# Patient Record
Sex: FEMALE | Race: WHITE | NOT HISPANIC OR LATINO | Employment: UNEMPLOYED | URBAN - NONMETROPOLITAN AREA
[De-identification: names, ages, dates, MRNs, and addresses within clinical notes are randomized per-mention and may not be internally consistent; named-entity substitution may affect disease eponyms.]

---

## 2018-03-27 ENCOUNTER — TRANSCRIBE ORDERS (OUTPATIENT)
Dept: ADMINISTRATIVE | Facility: HOSPITAL | Age: 58
End: 2018-03-27

## 2018-03-27 DIAGNOSIS — R94.5 ABNORMAL RESULTS OF LIVER FUNCTION STUDIES: Primary | ICD-10-CM

## 2018-04-05 ENCOUNTER — HOSPITAL ENCOUNTER (OUTPATIENT)
Dept: ULTRASOUND IMAGING | Facility: HOSPITAL | Age: 58
Discharge: HOME | End: 2018-04-05
Admitting: NURSE PRACTITIONER

## 2018-04-05 DIAGNOSIS — R94.5 ABNORMAL RESULTS OF LIVER FUNCTION STUDIES: ICD-10-CM

## 2018-04-05 PROCEDURE — 76700 US EXAM ABDOM COMPLETE: CPT

## 2018-04-05 PROCEDURE — 76700 US EXAM ABDOM COMPLETE: CPT | Performed by: RADIOLOGY

## 2018-04-17 ENCOUNTER — TRANSCRIBE ORDERS (OUTPATIENT)
Dept: GENERAL RADIOLOGY | Facility: HOSPITAL | Age: 58
End: 2018-04-17

## 2018-04-17 ENCOUNTER — HOSPITAL ENCOUNTER (OUTPATIENT)
Dept: GENERAL RADIOLOGY | Facility: HOSPITAL | Age: 58
Discharge: HOME | End: 2018-04-17
Admitting: PHYSICIAN ASSISTANT

## 2018-04-17 DIAGNOSIS — M25.512 LEFT SHOULDER PAIN, UNSPECIFIED CHRONICITY: Primary | ICD-10-CM

## 2018-04-17 DIAGNOSIS — M25.512 LEFT SHOULDER PAIN, UNSPECIFIED CHRONICITY: ICD-10-CM

## 2018-04-17 PROCEDURE — 73030 X-RAY EXAM OF SHOULDER: CPT | Performed by: RADIOLOGY

## 2018-04-17 PROCEDURE — 73030 X-RAY EXAM OF SHOULDER: CPT

## 2018-08-18 ENCOUNTER — HOSPITAL ENCOUNTER (EMERGENCY)
Facility: HOSPITAL | Age: 58
Discharge: HOME | End: 2018-08-18
Attending: EMERGENCY MEDICINE | Admitting: EMERGENCY MEDICINE

## 2018-08-18 VITALS
HEART RATE: 80 BPM | BODY MASS INDEX: 35.2 KG/M2 | OXYGEN SATURATION: 98 % | WEIGHT: 219 LBS | SYSTOLIC BLOOD PRESSURE: 158 MMHG | TEMPERATURE: 98 F | DIASTOLIC BLOOD PRESSURE: 62 MMHG | RESPIRATION RATE: 16 BRPM | HEIGHT: 66 IN

## 2018-08-18 DIAGNOSIS — R10.30 LOWER ABDOMINAL PAIN: Primary | ICD-10-CM

## 2018-08-18 LAB
ALBUMIN SERPL-MCNC: 4.5 G/DL (ref 3.5–5)
ALBUMIN/GLOB SERPL: 1.7 G/DL (ref 1.5–2.5)
ALP SERPL-CCNC: 27 U/L (ref 35–104)
ALT SERPL W P-5'-P-CCNC: 37 U/L (ref 10–36)
ANION GAP SERPL CALCULATED.3IONS-SCNC: 6.2 MMOL/L (ref 3.6–11.2)
AST SERPL-CCNC: 34 U/L (ref 10–30)
BASOPHILS # BLD AUTO: 0.03 10*3/MM3 (ref 0–0.3)
BASOPHILS NFR BLD AUTO: 0.3 % (ref 0–2)
BILIRUB SERPL-MCNC: 0.2 MG/DL (ref 0.2–1.8)
BILIRUB UR QL STRIP: NEGATIVE
BUN BLD-MCNC: 19 MG/DL (ref 7–21)
BUN/CREAT SERPL: 21.1 (ref 7–25)
CALCIUM SPEC-SCNC: 9.6 MG/DL (ref 7.7–10)
CHLORIDE SERPL-SCNC: 106 MMOL/L (ref 99–112)
CLARITY UR: CLEAR
CO2 SERPL-SCNC: 26.8 MMOL/L (ref 24.3–31.9)
COLOR UR: YELLOW
CREAT BLD-MCNC: 0.9 MG/DL (ref 0.43–1.29)
DEPRECATED RDW RBC AUTO: 42.5 FL (ref 37–54)
EOSINOPHIL # BLD AUTO: 1.22 10*3/MM3 (ref 0–0.7)
EOSINOPHIL NFR BLD AUTO: 11.8 % (ref 0–5)
ERYTHROCYTE [DISTWIDTH] IN BLOOD BY AUTOMATED COUNT: 13.3 % (ref 11.5–14.5)
GFR SERPL CREATININE-BSD FRML MDRD: 65 ML/MIN/1.73
GLOBULIN UR ELPH-MCNC: 2.7 GM/DL
GLUCOSE BLD-MCNC: 108 MG/DL (ref 70–110)
GLUCOSE UR STRIP-MCNC: NEGATIVE MG/DL
HCT VFR BLD AUTO: 39.8 % (ref 37–47)
HGB BLD-MCNC: 13.2 G/DL (ref 12–16)
HGB UR QL STRIP.AUTO: NEGATIVE
IMM GRANULOCYTES # BLD: 0.06 10*3/MM3 (ref 0–0.03)
IMM GRANULOCYTES NFR BLD: 0.6 % (ref 0–0.5)
KETONES UR QL STRIP: NEGATIVE
LEUKOCYTE ESTERASE UR QL STRIP.AUTO: NEGATIVE
LYMPHOCYTES # BLD AUTO: 4.19 10*3/MM3 (ref 1–3)
LYMPHOCYTES NFR BLD AUTO: 40.4 % (ref 21–51)
MCH RBC QN AUTO: 29.5 PG (ref 27–33)
MCHC RBC AUTO-ENTMCNC: 33.2 G/DL (ref 33–37)
MCV RBC AUTO: 88.8 FL (ref 80–94)
MONOCYTES # BLD AUTO: 0.68 10*3/MM3 (ref 0.1–0.9)
MONOCYTES NFR BLD AUTO: 6.6 % (ref 0–10)
NEUTROPHILS # BLD AUTO: 4.2 10*3/MM3 (ref 1.4–6.5)
NEUTROPHILS NFR BLD AUTO: 40.3 % (ref 30–70)
NITRITE UR QL STRIP: NEGATIVE
OSMOLALITY SERPL CALC.SUM OF ELEC: 280.3 MOSM/KG (ref 273–305)
PH UR STRIP.AUTO: 6.5 [PH] (ref 5–8)
PLATELET # BLD AUTO: 282 10*3/MM3 (ref 130–400)
PMV BLD AUTO: 10.5 FL (ref 6–10)
POTASSIUM BLD-SCNC: 3.6 MMOL/L (ref 3.5–5.3)
PROT SERPL-MCNC: 7.2 G/DL (ref 6–8)
PROT UR QL STRIP: NEGATIVE
RBC # BLD AUTO: 4.48 10*6/MM3 (ref 4.2–5.4)
SODIUM BLD-SCNC: 139 MMOL/L (ref 135–153)
SP GR UR STRIP: 1.01 (ref 1–1.03)
UROBILINOGEN UR QL STRIP: NORMAL
WBC NRBC COR # BLD: 10.38 10*3/MM3 (ref 4.5–12.5)

## 2018-08-18 PROCEDURE — 36415 COLL VENOUS BLD VENIPUNCTURE: CPT

## 2018-08-18 PROCEDURE — 99283 EMERGENCY DEPT VISIT LOW MDM: CPT

## 2018-08-18 PROCEDURE — 81003 URINALYSIS AUTO W/O SCOPE: CPT | Performed by: EMERGENCY MEDICINE

## 2018-08-18 PROCEDURE — 85025 COMPLETE CBC W/AUTO DIFF WBC: CPT | Performed by: PHYSICIAN ASSISTANT

## 2018-08-18 PROCEDURE — 80053 COMPREHEN METABOLIC PANEL: CPT | Performed by: PHYSICIAN ASSISTANT

## 2018-08-18 RX ORDER — FENOFIBRATE 145 MG/1
145 TABLET, COATED ORAL DAILY
COMMUNITY

## 2018-08-18 RX ORDER — HYDROCHLOROTHIAZIDE 12.5 MG/1
25 CAPSULE, GELATIN COATED ORAL DAILY
Status: ON HOLD | COMMUNITY
End: 2021-08-29

## 2018-08-18 RX ORDER — BUPROPION HYDROCHLORIDE 75 MG/1
300 TABLET ORAL 2 TIMES DAILY
Status: ON HOLD | COMMUNITY
End: 2021-08-29

## 2018-08-19 NOTE — ED PROVIDER NOTES
Subjective     Urinary Tract Infection   Pain quality:  Aching  Pain severity:  Moderate  Onset quality:  Gradual  Duration:  10 days  Timing:  Intermittent  Progression:  Waxing and waning  Chronicity:  New  Recent urinary tract infections: yes (Was diagnosed with UTI 10 days ago)    Relieved by:  Nothing  Worsened by:  Nothing  Ineffective treatments: Bactrim, Augmentin.  Urinary symptoms: discolored urine and frequent urination    Urinary symptoms: no foul-smelling urine, no hematuria, no hesitancy and no bladder incontinence    Associated symptoms: abdominal pain    Associated symptoms: no fever, no flank pain, no genital lesions, no nausea, no vaginal discharge and no vomiting    Risk factors: no hx of pyelonephritis, no hx of urolithiasis, no kidney transplant, not pregnant, no recurrent urinary tract infections, no renal cysts, no renal disease, not sexually active, no sexually transmitted infections, no single kidney and no urinary catheter        Review of Systems   Constitutional: Negative.  Negative for fever.   HENT: Negative.    Respiratory: Negative.    Cardiovascular: Negative.  Negative for chest pain.   Gastrointestinal: Positive for abdominal pain. Negative for abdominal distention, anal bleeding, blood in stool, constipation, diarrhea, nausea, rectal pain and vomiting.   Endocrine: Negative.    Genitourinary: Positive for frequency. Negative for decreased urine volume, difficulty urinating, dyspareunia, dysuria, enuresis, flank pain, genital sores, hematuria, menstrual problem, pelvic pain, urgency, vaginal bleeding, vaginal discharge and vaginal pain.   Skin: Negative.    Neurological: Negative.    Psychiatric/Behavioral: Negative.    All other systems reviewed and are negative.      Past Medical History:   Diagnosis Date   • Anxiety    • Diabetes mellitus (CMS/HCC)    • Hyperlipidemia    • Hypertension        Allergies   Allergen Reactions   • Sulfa Antibiotics Rash       Past Surgical History:    Procedure Laterality Date   • HYSTERECTOMY         History reviewed. No pertinent family history.    Social History     Social History   • Marital status:      Social History Main Topics   • Smoking status: Never Smoker   • Smokeless tobacco: Never Used   • Alcohol use No   • Drug use: Unknown   • Sexual activity: Defer     Other Topics Concern   • Not on file           Objective   Physical Exam   Constitutional: She is oriented to person, place, and time. She appears well-developed and well-nourished. No distress.   HENT:   Head: Normocephalic and atraumatic.   Right Ear: External ear normal.   Left Ear: External ear normal.   Nose: Nose normal.   Eyes: Pupils are equal, round, and reactive to light. Conjunctivae and EOM are normal.   Neck: Normal range of motion. Neck supple. No JVD present. No tracheal deviation present.   Cardiovascular: Normal rate, regular rhythm and normal heart sounds.    No murmur heard.  Pulmonary/Chest: Effort normal and breath sounds normal. No respiratory distress. She has no wheezes.   Abdominal: Soft. Bowel sounds are normal. She exhibits no distension and no mass. There is tenderness. There is no rebound and no guarding. No hernia.   Tenderness to palpation in the lower abdomen    Musculoskeletal: Normal range of motion. She exhibits no edema or deformity.   Neurological: She is alert and oriented to person, place, and time. No cranial nerve deficit.   Skin: Skin is warm and dry. No rash noted. She is not diaphoretic. No erythema. No pallor.   Psychiatric: She has a normal mood and affect. Her behavior is normal. Thought content normal.   Nursing note and vitals reviewed.      Procedures           ED Course  ED Course as of Aug 18 2329   Sat Aug 18, 2018   2326 Patient diagnosed with lower abdominal pain. She has no lab symptoms of a UTI. She will be d/c home and go to appt scheduled with GYN on Monday. Will return to ER if symptoms worsen.   [MM]      ED Course User  Index  [MM] Divya Bryant PA                  MDM  Number of Diagnoses or Management Options  Lower abdominal pain:      Amount and/or Complexity of Data Reviewed  Clinical lab tests: ordered and reviewed          Final diagnoses:   Lower abdominal pain            Divya Bryant PA  08/18/18 8536

## 2018-08-19 NOTE — ED NOTES
5197 PT IS AAO X4 PT HAS NO SIGNS OF DISTRESS BREATHING IS EQUAL AND UNLABORED SKIN PWD     Carmen Wolf, RN  08/18/18 8535

## 2018-08-19 NOTE — DISCHARGE INSTRUCTIONS
Please see your gynecologist on Monday as scheduled appointment. Please return to ER if symptoms worsen.

## 2020-01-27 ENCOUNTER — TRANSCRIBE ORDERS (OUTPATIENT)
Dept: ADMINISTRATIVE | Facility: HOSPITAL | Age: 60
End: 2020-01-27

## 2020-01-27 DIAGNOSIS — R10.9 STOMACH ACHE: Primary | ICD-10-CM

## 2020-02-04 ENCOUNTER — HOSPITAL ENCOUNTER (OUTPATIENT)
Dept: ULTRASOUND IMAGING | Facility: HOSPITAL | Age: 60
Discharge: HOME | End: 2020-02-04
Admitting: NURSE PRACTITIONER

## 2020-02-04 DIAGNOSIS — R10.9 STOMACH ACHE: ICD-10-CM

## 2020-02-04 PROCEDURE — 76700 US EXAM ABDOM COMPLETE: CPT

## 2020-02-04 PROCEDURE — 76700 US EXAM ABDOM COMPLETE: CPT | Performed by: RADIOLOGY

## 2021-08-29 ENCOUNTER — HOSPITAL ENCOUNTER (INPATIENT)
Facility: HOSPITAL | Age: 61
LOS: 7 days | Discharge: HOME | End: 2021-09-05
Attending: EMERGENCY MEDICINE | Admitting: INTERNAL MEDICINE

## 2021-08-29 ENCOUNTER — APPOINTMENT (OUTPATIENT)
Dept: GENERAL RADIOLOGY | Facility: HOSPITAL | Age: 61
End: 2021-08-29

## 2021-08-29 ENCOUNTER — APPOINTMENT (OUTPATIENT)
Dept: CT IMAGING | Facility: HOSPITAL | Age: 61
End: 2021-08-29

## 2021-08-29 DIAGNOSIS — U07.1 COVID-19: Primary | ICD-10-CM

## 2021-08-29 DIAGNOSIS — J96.01 ACUTE RESPIRATORY FAILURE WITH HYPOXIA (HCC): ICD-10-CM

## 2021-08-29 DIAGNOSIS — J12.82 PNEUMONIA DUE TO COVID-19 VIRUS: ICD-10-CM

## 2021-08-29 DIAGNOSIS — U07.1 PNEUMONIA DUE TO COVID-19 VIRUS: ICD-10-CM

## 2021-08-29 PROBLEM — E11.9 TYPE II DIABETES MELLITUS (HCC): Chronic | Status: ACTIVE | Noted: 2021-08-29

## 2021-08-29 PROBLEM — E78.5 HYPERLIPIDEMIA: Chronic | Status: ACTIVE | Noted: 2021-08-29

## 2021-08-29 PROBLEM — I10 ESSENTIAL HYPERTENSION: Chronic | Status: ACTIVE | Noted: 2021-08-29

## 2021-08-29 LAB
6-ACETYL MORPHINE: NEGATIVE
A-A DO2: 53.1 MMHG (ref 0–300)
ALBUMIN SERPL-MCNC: 4.02 G/DL (ref 3.5–5.2)
ALBUMIN/GLOB SERPL: 1.1 G/DL
ALP SERPL-CCNC: 40 U/L (ref 39–117)
ALT SERPL W P-5'-P-CCNC: 112 U/L (ref 1–33)
AMPHET+METHAMPHET UR QL: NEGATIVE
ANION GAP SERPL CALCULATED.3IONS-SCNC: 17.4 MMOL/L (ref 5–15)
ARTERIAL PATENCY WRIST A: POSITIVE
AST SERPL-CCNC: 143 U/L (ref 1–32)
ATMOSPHERIC PRESS: 731 MMHG
BARBITURATES UR QL SCN: NEGATIVE
BASE EXCESS BLDA CALC-SCNC: 1.3 MMOL/L (ref 0–2)
BASOPHILS # BLD AUTO: 0.01 10*3/MM3 (ref 0–0.2)
BASOPHILS NFR BLD AUTO: 0.1 % (ref 0–1.5)
BDY SITE: ABNORMAL
BENZODIAZ UR QL SCN: NEGATIVE
BILIRUB SERPL-MCNC: 0.4 MG/DL (ref 0–1.2)
BILIRUB UR QL STRIP: NEGATIVE
BODY TEMPERATURE: 0 C
BUN SERPL-MCNC: 19 MG/DL (ref 6–20)
BUN/CREAT SERPL: 23.2 (ref 7–25)
BUPRENORPHINE SERPL-MCNC: NEGATIVE NG/ML
CALCIUM SPEC-SCNC: 9.3 MG/DL (ref 8.6–10.5)
CANNABINOIDS SERPL QL: NEGATIVE
CHLORIDE SERPL-SCNC: 96 MMOL/L (ref 98–107)
CLARITY UR: CLEAR
CO2 BLDA-SCNC: 25.3 MMOL/L (ref 22–33)
CO2 SERPL-SCNC: 21.6 MMOL/L (ref 22–29)
COCAINE UR QL: NEGATIVE
COHGB MFR BLD: 0.8 % (ref 0–5)
COLOR UR: YELLOW
CREAT SERPL-MCNC: 0.82 MG/DL (ref 0.57–1)
CRP SERPL-MCNC: 5.25 MG/DL (ref 0–0.5)
D DIMER PPP FEU-MCNC: 0.47 MCGFEU/ML (ref 0–0.5)
D-LACTATE SERPL-SCNC: 4.3 MMOL/L (ref 0.5–2)
DEPRECATED RDW RBC AUTO: 41 FL (ref 37–54)
EOSINOPHIL # BLD AUTO: 0 10*3/MM3 (ref 0–0.4)
EOSINOPHIL NFR BLD AUTO: 0 % (ref 0.3–6.2)
ERYTHROCYTE [DISTWIDTH] IN BLOOD BY AUTOMATED COUNT: 13.2 % (ref 12.3–15.4)
ERYTHROCYTE [SEDIMENTATION RATE] IN BLOOD: 36 MM/HR (ref 0–30)
ETHANOL BLD-MCNC: <10 MG/DL (ref 0–10)
ETHANOL UR QL: <0.01 %
FLUAV RNA RESP QL NAA+PROBE: NOT DETECTED
FLUBV RNA RESP QL NAA+PROBE: NOT DETECTED
GFR SERPL CREATININE-BSD FRML MDRD: 72 ML/MIN/1.73
GLOBULIN UR ELPH-MCNC: 3.6 GM/DL
GLUCOSE BLDC GLUCOMTR-MCNC: 179 MG/DL (ref 70–130)
GLUCOSE SERPL-MCNC: 202 MG/DL (ref 65–99)
GLUCOSE UR STRIP-MCNC: NEGATIVE MG/DL
HCO3 BLDA-SCNC: 24.3 MMOL/L (ref 20–26)
HCT VFR BLD AUTO: 43.3 % (ref 34–46.6)
HCT VFR BLD CALC: 44.3 % (ref 38–51)
HGB BLD-MCNC: 14.2 G/DL (ref 12–15.9)
HGB BLDA-MCNC: 14.5 G/DL (ref 13.5–17.5)
HGB UR QL STRIP.AUTO: NEGATIVE
IMM GRANULOCYTES # BLD AUTO: 0.06 10*3/MM3 (ref 0–0.05)
IMM GRANULOCYTES NFR BLD AUTO: 0.7 % (ref 0–0.5)
INHALED O2 CONCENTRATION: 21 %
KETONES UR QL STRIP: NEGATIVE
LEUKOCYTE ESTERASE UR QL STRIP.AUTO: NEGATIVE
LYMPHOCYTES # BLD AUTO: 0.89 10*3/MM3 (ref 0.7–3.1)
LYMPHOCYTES NFR BLD AUTO: 10.1 % (ref 19.6–45.3)
Lab: ABNORMAL
Lab: ABNORMAL
MCH RBC QN AUTO: 28.3 PG (ref 26.6–33)
MCHC RBC AUTO-ENTMCNC: 32.8 G/DL (ref 31.5–35.7)
MCV RBC AUTO: 86.4 FL (ref 79–97)
METHADONE UR QL SCN: NEGATIVE
METHGB BLD QL: 0 % (ref 0–3)
MODALITY: ABNORMAL
MONOCYTES # BLD AUTO: 0.43 10*3/MM3 (ref 0.1–0.9)
MONOCYTES NFR BLD AUTO: 4.9 % (ref 5–12)
NEUTROPHILS NFR BLD AUTO: 7.41 10*3/MM3 (ref 1.7–7)
NEUTROPHILS NFR BLD AUTO: 84.2 % (ref 42.7–76)
NITRITE UR QL STRIP: NEGATIVE
NOTE: ABNORMAL
NOTIFIED BY: ABNORMAL
NOTIFIED WHO: ABNORMAL
NRBC BLD AUTO-RTO: 0 /100 WBC (ref 0–0.2)
OPIATES UR QL: NEGATIVE
OXYCODONE UR QL SCN: NEGATIVE
OXYHGB MFR BLDV: 88.6 % (ref 94–99)
PCO2 BLDA: 33.1 MM HG (ref 35–45)
PCO2 TEMP ADJ BLD: ABNORMAL MM[HG]
PCP UR QL SCN: NEGATIVE
PH BLDA: 7.47 PH UNITS (ref 7.35–7.45)
PH UR STRIP.AUTO: 5.5 [PH] (ref 5–8)
PH, TEMP CORRECTED: ABNORMAL
PLATELET # BLD AUTO: 248 10*3/MM3 (ref 140–450)
PMV BLD AUTO: 10 FL (ref 6–12)
PO2 BLDA: 53 MM HG (ref 83–108)
PO2 TEMP ADJ BLD: ABNORMAL MM[HG]
POTASSIUM SERPL-SCNC: 4.1 MMOL/L (ref 3.5–5.2)
PROT SERPL-MCNC: 7.6 G/DL (ref 6–8.5)
PROT UR QL STRIP: NEGATIVE
QT INTERVAL: 348 MS
QTC INTERVAL: 464 MS
RBC # BLD AUTO: 5.01 10*6/MM3 (ref 3.77–5.28)
SAO2 % BLDCOA: 89.2 % (ref 94–99)
SARS-COV-2 RNA RESP QL NAA+PROBE: DETECTED
SODIUM SERPL-SCNC: 135 MMOL/L (ref 136–145)
SP GR UR STRIP: 1.02 (ref 1–1.03)
TROPONIN T SERPL-MCNC: <0.01 NG/ML (ref 0–0.03)
TROPONIN T SERPL-MCNC: <0.01 NG/ML (ref 0–0.03)
UROBILINOGEN UR QL STRIP: NORMAL
VENTILATOR MODE: ABNORMAL
WBC # BLD AUTO: 8.8 10*3/MM3 (ref 3.4–10.8)

## 2021-08-29 PROCEDURE — 80307 DRUG TEST PRSMV CHEM ANLYZR: CPT | Performed by: EMERGENCY MEDICINE

## 2021-08-29 PROCEDURE — 81003 URINALYSIS AUTO W/O SCOPE: CPT | Performed by: EMERGENCY MEDICINE

## 2021-08-29 PROCEDURE — 83050 HGB METHEMOGLOBIN QUAN: CPT

## 2021-08-29 PROCEDURE — 80053 COMPREHEN METABOLIC PANEL: CPT | Performed by: EMERGENCY MEDICINE

## 2021-08-29 PROCEDURE — 83605 ASSAY OF LACTIC ACID: CPT | Performed by: EMERGENCY MEDICINE

## 2021-08-29 PROCEDURE — 71275 CT ANGIOGRAPHY CHEST: CPT

## 2021-08-29 PROCEDURE — 87636 SARSCOV2 & INF A&B AMP PRB: CPT | Performed by: EMERGENCY MEDICINE

## 2021-08-29 PROCEDURE — 25010000002 DEXAMETHASONE PER 1 MG: Performed by: EMERGENCY MEDICINE

## 2021-08-29 PROCEDURE — 93005 ELECTROCARDIOGRAM TRACING: CPT | Performed by: EMERGENCY MEDICINE

## 2021-08-29 PROCEDURE — 0 IOPAMIDOL PER 1 ML: Performed by: EMERGENCY MEDICINE

## 2021-08-29 PROCEDURE — 84484 ASSAY OF TROPONIN QUANT: CPT | Performed by: EMERGENCY MEDICINE

## 2021-08-29 PROCEDURE — 87040 BLOOD CULTURE FOR BACTERIA: CPT | Performed by: EMERGENCY MEDICINE

## 2021-08-29 PROCEDURE — 82077 ASSAY SPEC XCP UR&BREATH IA: CPT | Performed by: EMERGENCY MEDICINE

## 2021-08-29 PROCEDURE — 85025 COMPLETE CBC W/AUTO DIFF WBC: CPT | Performed by: EMERGENCY MEDICINE

## 2021-08-29 PROCEDURE — 36600 WITHDRAWAL OF ARTERIAL BLOOD: CPT

## 2021-08-29 PROCEDURE — 85379 FIBRIN DEGRADATION QUANT: CPT | Performed by: EMERGENCY MEDICINE

## 2021-08-29 PROCEDURE — 25010000002 KETOROLAC TROMETHAMINE PER 15 MG: Performed by: EMERGENCY MEDICINE

## 2021-08-29 PROCEDURE — 82962 GLUCOSE BLOOD TEST: CPT

## 2021-08-29 PROCEDURE — 82375 ASSAY CARBOXYHB QUANT: CPT

## 2021-08-29 PROCEDURE — 71045 X-RAY EXAM CHEST 1 VIEW: CPT

## 2021-08-29 PROCEDURE — 25010000002 ONDANSETRON PER 1 MG: Performed by: EMERGENCY MEDICINE

## 2021-08-29 PROCEDURE — 86140 C-REACTIVE PROTEIN: CPT | Performed by: EMERGENCY MEDICINE

## 2021-08-29 PROCEDURE — 84145 PROCALCITONIN (PCT): CPT | Performed by: EMERGENCY MEDICINE

## 2021-08-29 PROCEDURE — XW033E5 INTRODUCTION OF REMDESIVIR ANTI-INFECTIVE INTO PERIPHERAL VEIN, PERCUTANEOUS APPROACH, NEW TECHNOLOGY GROUP 5: ICD-10-PCS | Performed by: INTERNAL MEDICINE

## 2021-08-29 PROCEDURE — 25010000002 CEFTRIAXONE PER 250 MG: Performed by: EMERGENCY MEDICINE

## 2021-08-29 PROCEDURE — 99223 1ST HOSP IP/OBS HIGH 75: CPT | Performed by: PHYSICIAN ASSISTANT

## 2021-08-29 PROCEDURE — 99285 EMERGENCY DEPT VISIT HI MDM: CPT

## 2021-08-29 PROCEDURE — 85652 RBC SED RATE AUTOMATED: CPT | Performed by: EMERGENCY MEDICINE

## 2021-08-29 PROCEDURE — 82805 BLOOD GASES W/O2 SATURATION: CPT

## 2021-08-29 RX ORDER — DEXAMETHASONE SODIUM PHOSPHATE 10 MG/ML
10 INJECTION INTRAMUSCULAR; INTRAVENOUS ONCE
Status: COMPLETED | OUTPATIENT
Start: 2021-08-29 | End: 2021-08-29

## 2021-08-29 RX ORDER — NICOTINE POLACRILEX 4 MG
15 LOZENGE BUCCAL
Status: DISCONTINUED | OUTPATIENT
Start: 2021-08-29 | End: 2021-09-05 | Stop reason: HOSPADM

## 2021-08-29 RX ORDER — DEXAMETHASONE SODIUM PHOSPHATE 4 MG/ML
6 INJECTION, SOLUTION INTRA-ARTICULAR; INTRALESIONAL; INTRAMUSCULAR; INTRAVENOUS; SOFT TISSUE DAILY
Status: DISCONTINUED | OUTPATIENT
Start: 2021-08-30 | End: 2021-09-05 | Stop reason: HOSPADM

## 2021-08-29 RX ORDER — SODIUM CHLORIDE 0.9 % (FLUSH) 0.9 %
10 SYRINGE (ML) INJECTION AS NEEDED
Status: DISCONTINUED | OUTPATIENT
Start: 2021-08-29 | End: 2021-09-05 | Stop reason: HOSPADM

## 2021-08-29 RX ORDER — EZETIMIBE 10 MG/1
10 TABLET ORAL DAILY
COMMUNITY

## 2021-08-29 RX ORDER — HYDROXYZINE HYDROCHLORIDE 25 MG/1
25 TABLET, FILM COATED ORAL 3 TIMES DAILY PRN
Status: DISCONTINUED | OUTPATIENT
Start: 2021-08-29 | End: 2021-09-05 | Stop reason: HOSPADM

## 2021-08-29 RX ORDER — PANTOPRAZOLE SODIUM 40 MG/1
40 TABLET, DELAYED RELEASE ORAL
Status: DISCONTINUED | OUTPATIENT
Start: 2021-08-30 | End: 2021-09-05 | Stop reason: HOSPADM

## 2021-08-29 RX ORDER — CITALOPRAM 10 MG/1
10 TABLET ORAL DAILY
COMMUNITY

## 2021-08-29 RX ORDER — PROCHLORPERAZINE EDISYLATE 5 MG/ML
5 INJECTION INTRAMUSCULAR; INTRAVENOUS EVERY 6 HOURS PRN
Status: DISCONTINUED | OUTPATIENT
Start: 2021-08-29 | End: 2021-09-05 | Stop reason: HOSPADM

## 2021-08-29 RX ORDER — SODIUM CHLORIDE 9 MG/ML
75 INJECTION, SOLUTION INTRAVENOUS CONTINUOUS
Status: DISCONTINUED | OUTPATIENT
Start: 2021-08-30 | End: 2021-08-30

## 2021-08-29 RX ORDER — CALCIUM CARBONATE 200(500)MG
2 TABLET,CHEWABLE ORAL 3 TIMES DAILY PRN
Status: DISCONTINUED | OUTPATIENT
Start: 2021-08-29 | End: 2021-09-05 | Stop reason: HOSPADM

## 2021-08-29 RX ORDER — CHOLECALCIFEROL (VITAMIN D3) 125 MCG
10 CAPSULE ORAL NIGHTLY PRN
Status: DISCONTINUED | OUTPATIENT
Start: 2021-08-29 | End: 2021-09-05 | Stop reason: HOSPADM

## 2021-08-29 RX ORDER — ONDANSETRON 2 MG/ML
4 INJECTION INTRAMUSCULAR; INTRAVENOUS ONCE
Status: COMPLETED | OUTPATIENT
Start: 2021-08-29 | End: 2021-08-29

## 2021-08-29 RX ORDER — DEXTROSE MONOHYDRATE 25 G/50ML
25 INJECTION, SOLUTION INTRAVENOUS
Status: DISCONTINUED | OUTPATIENT
Start: 2021-08-29 | End: 2021-09-05 | Stop reason: HOSPADM

## 2021-08-29 RX ORDER — KETOROLAC TROMETHAMINE 30 MG/ML
30 INJECTION, SOLUTION INTRAMUSCULAR; INTRAVENOUS ONCE
Status: COMPLETED | OUTPATIENT
Start: 2021-08-29 | End: 2021-08-29

## 2021-08-29 RX ORDER — ALBUTEROL SULFATE 90 UG/1
2 AEROSOL, METERED RESPIRATORY (INHALATION)
Status: DISCONTINUED | OUTPATIENT
Start: 2021-08-30 | End: 2021-09-05 | Stop reason: HOSPADM

## 2021-08-29 RX ORDER — BUPROPION HYDROCHLORIDE 300 MG/1
300 TABLET ORAL DAILY
COMMUNITY

## 2021-08-29 RX ORDER — HYDROCHLOROTHIAZIDE 25 MG/1
25 TABLET ORAL DAILY
COMMUNITY

## 2021-08-29 RX ADMIN — DOXYCYCLINE 100 MG: 100 INJECTION, POWDER, LYOPHILIZED, FOR SOLUTION INTRAVENOUS at 20:01

## 2021-08-29 RX ADMIN — ONDANSETRON 4 MG: 2 INJECTION INTRAMUSCULAR; INTRAVENOUS at 16:36

## 2021-08-29 RX ADMIN — KETOROLAC TROMETHAMINE 30 MG: 30 INJECTION, SOLUTION INTRAMUSCULAR at 16:40

## 2021-08-29 RX ADMIN — CEFTRIAXONE 1 G: 1 INJECTION, POWDER, FOR SOLUTION INTRAMUSCULAR; INTRAVENOUS at 20:00

## 2021-08-29 RX ADMIN — DEXAMETHASONE SODIUM PHOSPHATE 10 MG: 10 INJECTION INTRAMUSCULAR; INTRAVENOUS at 16:38

## 2021-08-29 RX ADMIN — IOPAMIDOL 70 ML: 755 INJECTION, SOLUTION INTRAVENOUS at 19:05

## 2021-08-29 RX ADMIN — REMDESIVIR 200 MG: 100 INJECTION, POWDER, LYOPHILIZED, FOR SOLUTION INTRAVENOUS at 23:54

## 2021-08-29 RX ADMIN — SODIUM CHLORIDE 1000 ML: 9 INJECTION, SOLUTION INTRAVENOUS at 16:34

## 2021-08-30 LAB
ALBUMIN SERPL-MCNC: 3.42 G/DL (ref 3.5–5.2)
ALBUMIN/GLOB SERPL: 1 G/DL
ALP SERPL-CCNC: 35 U/L (ref 39–117)
ALT SERPL W P-5'-P-CCNC: 94 U/L (ref 1–33)
ANION GAP SERPL CALCULATED.3IONS-SCNC: 15 MMOL/L (ref 5–15)
AST SERPL-CCNC: 106 U/L (ref 1–32)
BASOPHILS # BLD AUTO: 0.01 10*3/MM3 (ref 0–0.2)
BASOPHILS NFR BLD AUTO: 0.2 % (ref 0–1.5)
BILIRUB SERPL-MCNC: 0.3 MG/DL (ref 0–1.2)
BUN SERPL-MCNC: 19 MG/DL (ref 6–20)
BUN/CREAT SERPL: 27.5 (ref 7–25)
CALCIUM SPEC-SCNC: 8.8 MG/DL (ref 8.6–10.5)
CHLORIDE SERPL-SCNC: 100 MMOL/L (ref 98–107)
CO2 SERPL-SCNC: 22 MMOL/L (ref 22–29)
CREAT SERPL-MCNC: 0.69 MG/DL (ref 0.57–1)
CRP SERPL-MCNC: 5 MG/DL (ref 0–0.5)
D DIMER PPP FEU-MCNC: 0.4 MCGFEU/ML (ref 0–0.5)
D-LACTATE SERPL-SCNC: 2.9 MMOL/L (ref 0.5–2)
DEPRECATED RDW RBC AUTO: 41.5 FL (ref 37–54)
EOSINOPHIL # BLD AUTO: 0 10*3/MM3 (ref 0–0.4)
EOSINOPHIL NFR BLD AUTO: 0 % (ref 0.3–6.2)
ERYTHROCYTE [DISTWIDTH] IN BLOOD BY AUTOMATED COUNT: 13 % (ref 12.3–15.4)
FIBRINOGEN PPP-MCNC: 476 MG/DL (ref 173–524)
GFR SERPL CREATININE-BSD FRML MDRD: 87 ML/MIN/1.73
GLOBULIN UR ELPH-MCNC: 3.5 GM/DL
GLUCOSE BLDC GLUCOMTR-MCNC: 128 MG/DL (ref 70–130)
GLUCOSE BLDC GLUCOMTR-MCNC: 137 MG/DL (ref 70–130)
GLUCOSE BLDC GLUCOMTR-MCNC: 141 MG/DL (ref 70–130)
GLUCOSE BLDC GLUCOMTR-MCNC: 150 MG/DL (ref 70–130)
GLUCOSE SERPL-MCNC: 195 MG/DL (ref 65–99)
HAV IGM SERPL QL IA: NORMAL
HBA1C MFR BLD: 6.4 % (ref 4.8–5.6)
HBV CORE IGM SERPL QL IA: NORMAL
HBV SURFACE AG SERPL QL IA: NORMAL
HCT VFR BLD AUTO: 38.5 % (ref 34–46.6)
HCV AB SER DONR QL: NORMAL
HGB BLD-MCNC: 13 G/DL (ref 12–15.9)
IMM GRANULOCYTES # BLD AUTO: 0.04 10*3/MM3 (ref 0–0.05)
IMM GRANULOCYTES NFR BLD AUTO: 0.6 % (ref 0–0.5)
LDH SERPL-CCNC: 257 U/L (ref 135–214)
LYMPHOCYTES # BLD AUTO: 1 10*3/MM3 (ref 0.7–3.1)
LYMPHOCYTES NFR BLD AUTO: 15.5 % (ref 19.6–45.3)
MAGNESIUM SERPL-MCNC: 2 MG/DL (ref 1.6–2.6)
MCH RBC QN AUTO: 29.3 PG (ref 26.6–33)
MCHC RBC AUTO-ENTMCNC: 33.8 G/DL (ref 31.5–35.7)
MCV RBC AUTO: 86.9 FL (ref 79–97)
MONOCYTES # BLD AUTO: 0.26 10*3/MM3 (ref 0.1–0.9)
MONOCYTES NFR BLD AUTO: 4 % (ref 5–12)
NEUTROPHILS NFR BLD AUTO: 5.15 10*3/MM3 (ref 1.7–7)
NEUTROPHILS NFR BLD AUTO: 79.7 % (ref 42.7–76)
NRBC BLD AUTO-RTO: 0 /100 WBC (ref 0–0.2)
PHOSPHATE SERPL-MCNC: 2.8 MG/DL (ref 2.5–4.5)
PLATELET # BLD AUTO: 190 10*3/MM3 (ref 140–450)
PMV BLD AUTO: 10.7 FL (ref 6–12)
POTASSIUM SERPL-SCNC: 4.4 MMOL/L (ref 3.5–5.2)
PROCALCITONIN SERPL-MCNC: 0.1 NG/ML (ref 0–0.25)
PROT SERPL-MCNC: 6.9 G/DL (ref 6–8.5)
RBC # BLD AUTO: 4.43 10*6/MM3 (ref 3.77–5.28)
SODIUM SERPL-SCNC: 137 MMOL/L (ref 136–145)
TSH SERPL DL<=0.05 MIU/L-ACNC: 0.56 UIU/ML (ref 0.27–4.2)
WBC # BLD AUTO: 6.46 10*3/MM3 (ref 3.4–10.8)

## 2021-08-30 PROCEDURE — 85025 COMPLETE CBC W/AUTO DIFF WBC: CPT | Performed by: INTERNAL MEDICINE

## 2021-08-30 PROCEDURE — 83735 ASSAY OF MAGNESIUM: CPT | Performed by: PHYSICIAN ASSISTANT

## 2021-08-30 PROCEDURE — 84443 ASSAY THYROID STIM HORMONE: CPT | Performed by: PHYSICIAN ASSISTANT

## 2021-08-30 PROCEDURE — 83605 ASSAY OF LACTIC ACID: CPT | Performed by: EMERGENCY MEDICINE

## 2021-08-30 PROCEDURE — 86140 C-REACTIVE PROTEIN: CPT | Performed by: PHYSICIAN ASSISTANT

## 2021-08-30 PROCEDURE — 83615 LACTATE (LD) (LDH) ENZYME: CPT | Performed by: INTERNAL MEDICINE

## 2021-08-30 PROCEDURE — 94799 UNLISTED PULMONARY SVC/PX: CPT

## 2021-08-30 PROCEDURE — 85384 FIBRINOGEN ACTIVITY: CPT | Performed by: INTERNAL MEDICINE

## 2021-08-30 PROCEDURE — 85379 FIBRIN DEGRADATION QUANT: CPT | Performed by: INTERNAL MEDICINE

## 2021-08-30 PROCEDURE — 25010000002 ENOXAPARIN PER 10 MG: Performed by: INTERNAL MEDICINE

## 2021-08-30 PROCEDURE — 63710000001 INSULIN ASPART PER 5 UNITS: Performed by: PHYSICIAN ASSISTANT

## 2021-08-30 PROCEDURE — 80053 COMPREHEN METABOLIC PANEL: CPT | Performed by: INTERNAL MEDICINE

## 2021-08-30 PROCEDURE — 82962 GLUCOSE BLOOD TEST: CPT

## 2021-08-30 PROCEDURE — 80074 ACUTE HEPATITIS PANEL: CPT | Performed by: PHYSICIAN ASSISTANT

## 2021-08-30 PROCEDURE — 83036 HEMOGLOBIN GLYCOSYLATED A1C: CPT | Performed by: PHYSICIAN ASSISTANT

## 2021-08-30 PROCEDURE — 94640 AIRWAY INHALATION TREATMENT: CPT

## 2021-08-30 PROCEDURE — 25010000002 DEXAMETHASONE PER 1 MG: Performed by: INTERNAL MEDICINE

## 2021-08-30 PROCEDURE — 84100 ASSAY OF PHOSPHORUS: CPT | Performed by: PHYSICIAN ASSISTANT

## 2021-08-30 PROCEDURE — 99232 SBSQ HOSP IP/OBS MODERATE 35: CPT | Performed by: INTERNAL MEDICINE

## 2021-08-30 RX ORDER — HYDROCHLOROTHIAZIDE 25 MG/1
25 TABLET ORAL DAILY
Status: CANCELLED | OUTPATIENT
Start: 2021-08-30

## 2021-08-30 RX ORDER — ESTRADIOL 10 UG/1
1 INSERT VAGINAL 2 TIMES WEEKLY
COMMUNITY

## 2021-08-30 RX ORDER — ACETAMINOPHEN 325 MG/1
650 TABLET ORAL EVERY 6 HOURS PRN
Status: DISCONTINUED | OUTPATIENT
Start: 2021-08-30 | End: 2021-09-05 | Stop reason: HOSPADM

## 2021-08-30 RX ORDER — CITALOPRAM 20 MG/1
10 TABLET ORAL DAILY
Status: DISCONTINUED | OUTPATIENT
Start: 2021-08-30 | End: 2021-09-05 | Stop reason: HOSPADM

## 2021-08-30 RX ORDER — BUPROPION HYDROCHLORIDE 150 MG/1
300 TABLET ORAL DAILY
Status: DISCONTINUED | OUTPATIENT
Start: 2021-08-30 | End: 2021-09-05 | Stop reason: HOSPADM

## 2021-08-30 RX ADMIN — SODIUM CHLORIDE 75 ML/HR: 9 INJECTION, SOLUTION INTRAVENOUS at 14:14

## 2021-08-30 RX ADMIN — ENOXAPARIN SODIUM 50 MG: 60 INJECTION SUBCUTANEOUS at 20:25

## 2021-08-30 RX ADMIN — ALBUTEROL SULFATE 2 PUFF: 90 AEROSOL, METERED RESPIRATORY (INHALATION) at 11:42

## 2021-08-30 RX ADMIN — ACETAMINOPHEN 650 MG: 325 TABLET ORAL at 23:45

## 2021-08-30 RX ADMIN — REMDESIVIR 100 MG: 100 INJECTION, POWDER, LYOPHILIZED, FOR SOLUTION INTRAVENOUS at 23:45

## 2021-08-30 RX ADMIN — ALBUTEROL SULFATE 2 PUFF: 90 AEROSOL, METERED RESPIRATORY (INHALATION) at 08:04

## 2021-08-30 RX ADMIN — DEXAMETHASONE SODIUM PHOSPHATE 6 MG: 4 INJECTION, SOLUTION INTRA-ARTICULAR; INTRALESIONAL; INTRAMUSCULAR; INTRAVENOUS; SOFT TISSUE at 08:03

## 2021-08-30 RX ADMIN — ENOXAPARIN SODIUM 50 MG: 60 INJECTION SUBCUTANEOUS at 02:14

## 2021-08-30 RX ADMIN — ALBUTEROL SULFATE 2 PUFF: 90 AEROSOL, METERED RESPIRATORY (INHALATION) at 19:47

## 2021-08-30 RX ADMIN — CITALOPRAM HYDROBROMIDE 10 MG: 20 TABLET ORAL at 08:03

## 2021-08-30 RX ADMIN — ALBUTEROL SULFATE 2 PUFF: 90 AEROSOL, METERED RESPIRATORY (INHALATION) at 17:04

## 2021-08-30 RX ADMIN — ALBUTEROL SULFATE 2 PUFF: 90 AEROSOL, METERED RESPIRATORY (INHALATION) at 02:14

## 2021-08-30 RX ADMIN — INSULIN ASPART 2 UNITS: 100 INJECTION, SOLUTION INTRAVENOUS; SUBCUTANEOUS at 11:42

## 2021-08-30 RX ADMIN — SODIUM CHLORIDE 75 ML/HR: 9 INJECTION, SOLUTION INTRAVENOUS at 00:40

## 2021-08-30 RX ADMIN — BUPROPION HYDROCHLORIDE 300 MG: 150 TABLET, FILM COATED, EXTENDED RELEASE ORAL at 08:04

## 2021-08-30 RX ADMIN — PANTOPRAZOLE SODIUM 40 MG: 40 TABLET, DELAYED RELEASE ORAL at 06:00

## 2021-08-31 LAB
ALBUMIN SERPL-MCNC: 3.41 G/DL (ref 3.5–5.2)
ALBUMIN/GLOB SERPL: 1.1 G/DL
ALP SERPL-CCNC: 34 U/L (ref 39–117)
ALT SERPL W P-5'-P-CCNC: 74 U/L (ref 1–33)
ANION GAP SERPL CALCULATED.3IONS-SCNC: 12.4 MMOL/L (ref 5–15)
AST SERPL-CCNC: 89 U/L (ref 1–32)
BASOPHILS # BLD AUTO: 0.03 10*3/MM3 (ref 0–0.2)
BASOPHILS NFR BLD AUTO: 0.4 % (ref 0–1.5)
BILIRUB SERPL-MCNC: 0.3 MG/DL (ref 0–1.2)
BUN SERPL-MCNC: 24 MG/DL (ref 6–20)
BUN/CREAT SERPL: 33.3 (ref 7–25)
CALCIUM SPEC-SCNC: 8.7 MG/DL (ref 8.6–10.5)
CHLORIDE SERPL-SCNC: 101 MMOL/L (ref 98–107)
CO2 SERPL-SCNC: 24.6 MMOL/L (ref 22–29)
CREAT SERPL-MCNC: 0.72 MG/DL (ref 0.57–1)
CRP SERPL-MCNC: 2.22 MG/DL (ref 0–0.5)
D-LACTATE SERPL-SCNC: 2.6 MMOL/L (ref 0.5–2)
DEPRECATED RDW RBC AUTO: 43.8 FL (ref 37–54)
EOSINOPHIL # BLD AUTO: 0.06 10*3/MM3 (ref 0–0.4)
EOSINOPHIL NFR BLD AUTO: 0.7 % (ref 0.3–6.2)
ERYTHROCYTE [DISTWIDTH] IN BLOOD BY AUTOMATED COUNT: 13.4 % (ref 12.3–15.4)
GFR SERPL CREATININE-BSD FRML MDRD: 83 ML/MIN/1.73
GLOBULIN UR ELPH-MCNC: 3 GM/DL
GLUCOSE BLDC GLUCOMTR-MCNC: 115 MG/DL (ref 70–130)
GLUCOSE BLDC GLUCOMTR-MCNC: 137 MG/DL (ref 70–130)
GLUCOSE BLDC GLUCOMTR-MCNC: 160 MG/DL (ref 70–130)
GLUCOSE BLDC GLUCOMTR-MCNC: 89 MG/DL (ref 70–130)
GLUCOSE SERPL-MCNC: 112 MG/DL (ref 65–99)
HCT VFR BLD AUTO: 37.6 % (ref 34–46.6)
HGB BLD-MCNC: 12.1 G/DL (ref 12–15.9)
IMM GRANULOCYTES # BLD AUTO: 0.07 10*3/MM3 (ref 0–0.05)
IMM GRANULOCYTES NFR BLD AUTO: 0.9 % (ref 0–0.5)
LYMPHOCYTES # BLD AUTO: 1.58 10*3/MM3 (ref 0.7–3.1)
LYMPHOCYTES NFR BLD AUTO: 19.7 % (ref 19.6–45.3)
MAGNESIUM SERPL-MCNC: 1.8 MG/DL (ref 1.6–2.6)
MCH RBC QN AUTO: 28.6 PG (ref 26.6–33)
MCHC RBC AUTO-ENTMCNC: 32.2 G/DL (ref 31.5–35.7)
MCV RBC AUTO: 88.9 FL (ref 79–97)
MONOCYTES # BLD AUTO: 0.49 10*3/MM3 (ref 0.1–0.9)
MONOCYTES NFR BLD AUTO: 6.1 % (ref 5–12)
NEUTROPHILS NFR BLD AUTO: 5.81 10*3/MM3 (ref 1.7–7)
NEUTROPHILS NFR BLD AUTO: 72.2 % (ref 42.7–76)
NRBC BLD AUTO-RTO: 0 /100 WBC (ref 0–0.2)
PHOSPHATE SERPL-MCNC: 3 MG/DL (ref 2.5–4.5)
PLATELET # BLD AUTO: 222 10*3/MM3 (ref 140–450)
PMV BLD AUTO: 10.2 FL (ref 6–12)
POTASSIUM SERPL-SCNC: 4 MMOL/L (ref 3.5–5.2)
PROT SERPL-MCNC: 6.4 G/DL (ref 6–8.5)
RBC # BLD AUTO: 4.23 10*6/MM3 (ref 3.77–5.28)
SODIUM SERPL-SCNC: 138 MMOL/L (ref 136–145)
WBC # BLD AUTO: 8.04 10*3/MM3 (ref 3.4–10.8)

## 2021-08-31 PROCEDURE — 94799 UNLISTED PULMONARY SVC/PX: CPT

## 2021-08-31 PROCEDURE — 63710000001 INSULIN ASPART PER 5 UNITS: Performed by: PHYSICIAN ASSISTANT

## 2021-08-31 PROCEDURE — 25010000002 ENOXAPARIN PER 10 MG: Performed by: INTERNAL MEDICINE

## 2021-08-31 PROCEDURE — 25010000002 DEXAMETHASONE PER 1 MG: Performed by: INTERNAL MEDICINE

## 2021-08-31 PROCEDURE — 83735 ASSAY OF MAGNESIUM: CPT | Performed by: INTERNAL MEDICINE

## 2021-08-31 PROCEDURE — 80053 COMPREHEN METABOLIC PANEL: CPT | Performed by: INTERNAL MEDICINE

## 2021-08-31 PROCEDURE — 85025 COMPLETE CBC W/AUTO DIFF WBC: CPT | Performed by: INTERNAL MEDICINE

## 2021-08-31 PROCEDURE — 84100 ASSAY OF PHOSPHORUS: CPT | Performed by: INTERNAL MEDICINE

## 2021-08-31 PROCEDURE — 82962 GLUCOSE BLOOD TEST: CPT

## 2021-08-31 PROCEDURE — 25010000002 MAGNESIUM SULFATE 2 GM/50ML SOLUTION: Performed by: INTERNAL MEDICINE

## 2021-08-31 PROCEDURE — 25010000002 FUROSEMIDE PER 20 MG: Performed by: INTERNAL MEDICINE

## 2021-08-31 PROCEDURE — 99232 SBSQ HOSP IP/OBS MODERATE 35: CPT | Performed by: INTERNAL MEDICINE

## 2021-08-31 PROCEDURE — 83605 ASSAY OF LACTIC ACID: CPT | Performed by: INTERNAL MEDICINE

## 2021-08-31 PROCEDURE — 86140 C-REACTIVE PROTEIN: CPT | Performed by: INTERNAL MEDICINE

## 2021-08-31 RX ORDER — ESTRADIOL 10 UG/1
10 INSERT VAGINAL 2 TIMES WEEKLY
Status: DISCONTINUED | OUTPATIENT
Start: 2021-08-31 | End: 2021-09-05 | Stop reason: HOSPADM

## 2021-08-31 RX ORDER — FUROSEMIDE 10 MG/ML
40 INJECTION INTRAMUSCULAR; INTRAVENOUS ONCE
Status: COMPLETED | OUTPATIENT
Start: 2021-08-31 | End: 2021-08-31

## 2021-08-31 RX ORDER — GUAIFENESIN/DEXTROMETHORPHAN 100-10MG/5
5 SYRUP ORAL EVERY 4 HOURS PRN
Status: DISCONTINUED | OUTPATIENT
Start: 2021-08-31 | End: 2021-09-05 | Stop reason: HOSPADM

## 2021-08-31 RX ORDER — MAGNESIUM SULFATE HEPTAHYDRATE 40 MG/ML
2 INJECTION, SOLUTION INTRAVENOUS ONCE
Status: COMPLETED | OUTPATIENT
Start: 2021-08-31 | End: 2021-08-31

## 2021-08-31 RX ADMIN — BUPROPION HYDROCHLORIDE 300 MG: 150 TABLET, FILM COATED, EXTENDED RELEASE ORAL at 09:21

## 2021-08-31 RX ADMIN — ACETAMINOPHEN 650 MG: 325 TABLET ORAL at 22:36

## 2021-08-31 RX ADMIN — CITALOPRAM HYDROBROMIDE 10 MG: 20 TABLET ORAL at 09:21

## 2021-08-31 RX ADMIN — ALBUTEROL SULFATE 2 PUFF: 90 AEROSOL, METERED RESPIRATORY (INHALATION) at 21:05

## 2021-08-31 RX ADMIN — ENOXAPARIN SODIUM 50 MG: 60 INJECTION SUBCUTANEOUS at 21:05

## 2021-08-31 RX ADMIN — ESTRADIOL 10 MCG: 10 TABLET, FILM COATED VAGINAL at 21:12

## 2021-08-31 RX ADMIN — ALBUTEROL SULFATE 2 PUFF: 90 AEROSOL, METERED RESPIRATORY (INHALATION) at 09:22

## 2021-08-31 RX ADMIN — DEXAMETHASONE SODIUM PHOSPHATE 6 MG: 4 INJECTION, SOLUTION INTRA-ARTICULAR; INTRALESIONAL; INTRAMUSCULAR; INTRAVENOUS; SOFT TISSUE at 09:19

## 2021-08-31 RX ADMIN — MAGNESIUM SULFATE HEPTAHYDRATE 2 G: 40 INJECTION, SOLUTION INTRAVENOUS at 11:27

## 2021-08-31 RX ADMIN — GUAIFENESIN AND DEXTROMETHORPHAN 5 ML: 100; 10 SYRUP ORAL at 13:54

## 2021-08-31 RX ADMIN — GUAIFENESIN AND DEXTROMETHORPHAN 5 ML: 100; 10 SYRUP ORAL at 09:28

## 2021-08-31 RX ADMIN — ALBUTEROL SULFATE 2 PUFF: 90 AEROSOL, METERED RESPIRATORY (INHALATION) at 17:20

## 2021-08-31 RX ADMIN — INSULIN ASPART 2 UNITS: 100 INJECTION, SOLUTION INTRAVENOUS; SUBCUTANEOUS at 17:20

## 2021-08-31 RX ADMIN — FUROSEMIDE 40 MG: 10 INJECTION, SOLUTION INTRAMUSCULAR; INTRAVENOUS at 09:22

## 2021-08-31 RX ADMIN — GUAIFENESIN AND DEXTROMETHORPHAN 5 ML: 100; 10 SYRUP ORAL at 21:05

## 2021-08-31 RX ADMIN — PANTOPRAZOLE SODIUM 40 MG: 40 TABLET, DELAYED RELEASE ORAL at 05:18

## 2021-09-01 LAB
ALBUMIN SERPL-MCNC: 3.23 G/DL (ref 3.5–5.2)
ALBUMIN/GLOB SERPL: 0.9 G/DL
ALP SERPL-CCNC: 41 U/L (ref 39–117)
ALT SERPL W P-5'-P-CCNC: 77 U/L (ref 1–33)
ANION GAP SERPL CALCULATED.3IONS-SCNC: 12.3 MMOL/L (ref 5–15)
AST SERPL-CCNC: 100 U/L (ref 1–32)
BASOPHILS # BLD AUTO: 0.05 10*3/MM3 (ref 0–0.2)
BASOPHILS NFR BLD AUTO: 0.6 % (ref 0–1.5)
BILIRUB SERPL-MCNC: 0.4 MG/DL (ref 0–1.2)
BUN SERPL-MCNC: 23 MG/DL (ref 6–20)
BUN/CREAT SERPL: 33.8 (ref 7–25)
CALCIUM SPEC-SCNC: 9.1 MG/DL (ref 8.6–10.5)
CHLORIDE SERPL-SCNC: 96 MMOL/L (ref 98–107)
CO2 SERPL-SCNC: 22.7 MMOL/L (ref 22–29)
CREAT SERPL-MCNC: 0.68 MG/DL (ref 0.57–1)
CRP SERPL-MCNC: 1.48 MG/DL (ref 0–0.5)
D DIMER PPP FEU-MCNC: 0.63 MCGFEU/ML (ref 0–0.5)
DEPRECATED RDW RBC AUTO: 42.4 FL (ref 37–54)
EOSINOPHIL # BLD AUTO: 0.05 10*3/MM3 (ref 0–0.4)
EOSINOPHIL NFR BLD AUTO: 0.6 % (ref 0.3–6.2)
ERYTHROCYTE [DISTWIDTH] IN BLOOD BY AUTOMATED COUNT: 13.2 % (ref 12.3–15.4)
FIBRINOGEN PPP-MCNC: 449 MG/DL (ref 173–524)
GFR SERPL CREATININE-BSD FRML MDRD: 89 ML/MIN/1.73
GLOBULIN UR ELPH-MCNC: 3.8 GM/DL
GLUCOSE BLDC GLUCOMTR-MCNC: 104 MG/DL (ref 70–130)
GLUCOSE BLDC GLUCOMTR-MCNC: 121 MG/DL (ref 70–130)
GLUCOSE BLDC GLUCOMTR-MCNC: 126 MG/DL (ref 70–130)
GLUCOSE BLDC GLUCOMTR-MCNC: 166 MG/DL (ref 70–130)
GLUCOSE SERPL-MCNC: 106 MG/DL (ref 65–99)
HCT VFR BLD AUTO: 40.6 % (ref 34–46.6)
HGB BLD-MCNC: 13.2 G/DL (ref 12–15.9)
IMM GRANULOCYTES # BLD AUTO: 0.25 10*3/MM3 (ref 0–0.05)
IMM GRANULOCYTES NFR BLD AUTO: 3.1 % (ref 0–0.5)
LDH SERPL-CCNC: 315 U/L (ref 135–214)
LYMPHOCYTES # BLD AUTO: 1.39 10*3/MM3 (ref 0.7–3.1)
LYMPHOCYTES NFR BLD AUTO: 17.3 % (ref 19.6–45.3)
MAGNESIUM SERPL-MCNC: 2.1 MG/DL (ref 1.6–2.6)
MCH RBC QN AUTO: 28.5 PG (ref 26.6–33)
MCHC RBC AUTO-ENTMCNC: 32.5 G/DL (ref 31.5–35.7)
MCV RBC AUTO: 87.7 FL (ref 79–97)
MONOCYTES # BLD AUTO: 0.57 10*3/MM3 (ref 0.1–0.9)
MONOCYTES NFR BLD AUTO: 7.1 % (ref 5–12)
NEUTROPHILS NFR BLD AUTO: 5.74 10*3/MM3 (ref 1.7–7)
NEUTROPHILS NFR BLD AUTO: 71.3 % (ref 42.7–76)
NRBC BLD AUTO-RTO: 0 /100 WBC (ref 0–0.2)
PLATELET # BLD AUTO: 265 10*3/MM3 (ref 140–450)
PMV BLD AUTO: 10.1 FL (ref 6–12)
POTASSIUM SERPL-SCNC: 4 MMOL/L (ref 3.5–5.2)
PROT SERPL-MCNC: 7 G/DL (ref 6–8.5)
RBC # BLD AUTO: 4.63 10*6/MM3 (ref 3.77–5.28)
SODIUM SERPL-SCNC: 131 MMOL/L (ref 136–145)
WBC # BLD AUTO: 8.05 10*3/MM3 (ref 3.4–10.8)

## 2021-09-01 PROCEDURE — 85025 COMPLETE CBC W/AUTO DIFF WBC: CPT | Performed by: INTERNAL MEDICINE

## 2021-09-01 PROCEDURE — 83735 ASSAY OF MAGNESIUM: CPT | Performed by: INTERNAL MEDICINE

## 2021-09-01 PROCEDURE — 94799 UNLISTED PULMONARY SVC/PX: CPT

## 2021-09-01 PROCEDURE — 25010000002 ENOXAPARIN PER 10 MG: Performed by: INTERNAL MEDICINE

## 2021-09-01 PROCEDURE — 83615 LACTATE (LD) (LDH) ENZYME: CPT | Performed by: INTERNAL MEDICINE

## 2021-09-01 PROCEDURE — 25010000002 DEXAMETHASONE PER 1 MG: Performed by: INTERNAL MEDICINE

## 2021-09-01 PROCEDURE — 25010000002 FUROSEMIDE PER 20 MG: Performed by: INTERNAL MEDICINE

## 2021-09-01 PROCEDURE — 80053 COMPREHEN METABOLIC PANEL: CPT | Performed by: INTERNAL MEDICINE

## 2021-09-01 PROCEDURE — 86140 C-REACTIVE PROTEIN: CPT | Performed by: INTERNAL MEDICINE

## 2021-09-01 PROCEDURE — 63710000001 INSULIN ASPART PER 5 UNITS: Performed by: PHYSICIAN ASSISTANT

## 2021-09-01 PROCEDURE — 82962 GLUCOSE BLOOD TEST: CPT

## 2021-09-01 PROCEDURE — 85379 FIBRIN DEGRADATION QUANT: CPT | Performed by: INTERNAL MEDICINE

## 2021-09-01 PROCEDURE — 85384 FIBRINOGEN ACTIVITY: CPT | Performed by: INTERNAL MEDICINE

## 2021-09-01 PROCEDURE — 99232 SBSQ HOSP IP/OBS MODERATE 35: CPT | Performed by: INTERNAL MEDICINE

## 2021-09-01 RX ORDER — FUROSEMIDE 10 MG/ML
40 INJECTION INTRAMUSCULAR; INTRAVENOUS ONCE
Status: COMPLETED | OUTPATIENT
Start: 2021-09-01 | End: 2021-09-01

## 2021-09-01 RX ORDER — GUAIFENESIN 600 MG/1
1200 TABLET, EXTENDED RELEASE ORAL EVERY 12 HOURS SCHEDULED
Status: DISCONTINUED | OUTPATIENT
Start: 2021-09-01 | End: 2021-09-05 | Stop reason: HOSPADM

## 2021-09-01 RX ADMIN — ACETAMINOPHEN 650 MG: 325 TABLET ORAL at 21:11

## 2021-09-01 RX ADMIN — BUPROPION HYDROCHLORIDE 300 MG: 150 TABLET, FILM COATED, EXTENDED RELEASE ORAL at 09:16

## 2021-09-01 RX ADMIN — DEXAMETHASONE SODIUM PHOSPHATE 6 MG: 4 INJECTION, SOLUTION INTRA-ARTICULAR; INTRALESIONAL; INTRAMUSCULAR; INTRAVENOUS; SOFT TISSUE at 09:16

## 2021-09-01 RX ADMIN — INSULIN ASPART 2 UNITS: 100 INJECTION, SOLUTION INTRAVENOUS; SUBCUTANEOUS at 17:19

## 2021-09-01 RX ADMIN — ALBUTEROL SULFATE 2 PUFF: 90 AEROSOL, METERED RESPIRATORY (INHALATION) at 21:11

## 2021-09-01 RX ADMIN — PANTOPRAZOLE SODIUM 40 MG: 40 TABLET, DELAYED RELEASE ORAL at 05:06

## 2021-09-01 RX ADMIN — ENOXAPARIN SODIUM 50 MG: 60 INJECTION SUBCUTANEOUS at 21:12

## 2021-09-01 RX ADMIN — GUAIFENESIN AND DEXTROMETHORPHAN 5 ML: 100; 10 SYRUP ORAL at 09:16

## 2021-09-01 RX ADMIN — GUAIFENESIN 1200 MG: 600 TABLET, EXTENDED RELEASE ORAL at 21:12

## 2021-09-01 RX ADMIN — GUAIFENESIN AND DEXTROMETHORPHAN 5 ML: 100; 10 SYRUP ORAL at 05:06

## 2021-09-01 RX ADMIN — REMDESIVIR 100 MG: 100 INJECTION, POWDER, LYOPHILIZED, FOR SOLUTION INTRAVENOUS at 00:09

## 2021-09-01 RX ADMIN — ALBUTEROL SULFATE 2 PUFF: 90 AEROSOL, METERED RESPIRATORY (INHALATION) at 09:15

## 2021-09-01 RX ADMIN — CITALOPRAM HYDROBROMIDE 10 MG: 20 TABLET ORAL at 09:15

## 2021-09-01 RX ADMIN — FUROSEMIDE 40 MG: 10 INJECTION INTRAMUSCULAR; INTRAVENOUS at 11:39

## 2021-09-01 RX ADMIN — ALBUTEROL SULFATE 2 PUFF: 90 AEROSOL, METERED RESPIRATORY (INHALATION) at 13:27

## 2021-09-01 NOTE — PLAN OF CARE
Goal Outcome Evaluation:              Outcome Summary: No acute changes this shift, 6L NC in place 02 Sat 92%, VSS, complained of a headache PRN medications given, Will Continue to monitor.

## 2021-09-01 NOTE — PLAN OF CARE
Goal Outcome Evaluation:  Plan of Care Reviewed With: patient      Progress: no change  Outcome Summary: Pt remains on 6 L humidified NC today, saturation being 91-93%. x1 IV Lasix given. Pt has two more doses Remdesivir left. No issues/complaints noted.

## 2021-09-01 NOTE — PROGRESS NOTES
PROGRESS NOTE         Patient Identification:  Name:  Melyssa Reagan  Age:  60 y.o.  Sex:  female  :  1960  MRN:  [id number]  Visit Number:  [id number]  Primary Care Provider:  Georgina Ramos APRN         LOS: 3 days       ----------------------------------------------------------------------------------------------------------------------  Subjective       Chief Complaints:    Shortness of Breath and Exposure To Known Illness        Interval History:      Discussed case with primary RN.  Patient's oxygen requirements remain at 6 L nasal cannula today.  Patient has had some shortness of breath this morning but otherwise denies any complaints at this time.  Afebrile, no diarrhea.  CRP is improved at 1.48.  WBC remains normal.    Review of Systems:    Constitutional: no fever, chills and night sweats. No unexpected weight change.  Fatigue.  Eyes: no eye drainage, itching or redness.  HEENT: no mouth sores, dysphagia or nose bleed.  Loss of taste and smell.  Respiratory: Shortness of breath and nonproductive cough.  Cardiovascular: no chest pain, no palpitations, no orthopnea.  Gastrointestinal: no nausea, vomiting or diarrhea. No abdominal pain, hematemesis or rectal bleeding.    Genitourinary: no dysuria or polyuria.  Hematologic/lymphatic: no lymph node abnormalities, no easy bruising or easy bleeding.  Musculoskeletal: no muscle or joint pain.  Skin: No rash and no itching.  Neurological: no loss of consciousness, no seizure, no headache.  Behavioral/Psych: no depression or suicidal ideation.  Endocrine: no hot flashes.  Immunologic: negative.     ----------------------------------------------------------------------------------------------------------------------      Objective       Current Hospital Meds:  albuterol sulfate HFA, 2 puff, Inhalation, 4x Daily - RT  buPROPion XL, 300 mg, Oral, Daily  citalopram, 10 mg, Oral, Daily  dexamethasone, 6 mg, Intravenous, Daily  enoxaparin, 0.5  mg/kg, Subcutaneous, Nightly  estradiol, 10 mcg, Vaginal, Once per day on Mon Thu  insulin aspart, 0-7 Units, Subcutaneous, TID AC  pantoprazole, 40 mg, Oral, Q AM  remdesivir, 100 mg, Intravenous, Q24H      Pharmacy to Dose enoxaparin (LOVENOX),       ----------------------------------------------------------------------------------------------------------------------    Vital Signs:  Temp:  [96 °F (35.6 °C)-98.5 °F (36.9 °C)] 97.9 °F (36.6 °C)  Heart Rate:  [63-87] 79  Resp:  [16-20] 20  BP: (111-141)/(67-79) 133/76  Mean Arterial Pressure (Non-Invasive) for the past 24 hrs (Last 3 readings):   Noninvasive MAP (mmHg)   09/01/21 1041 95   09/01/21 0600 105   08/31/21 1500 85     SpO2 Percentage    09/01/21 0300 09/01/21 0600 09/01/21 1041   SpO2: 93% 94% 93%     SpO2:  [91 %-94 %] 93 %  on  Flow (L/min):  [6] 6;   Device (Oxygen Therapy): humidified;nasal cannula    Body mass index is 39.74 kg/m².  Wt Readings from Last 3 Encounters:   08/29/21 108 kg (238 lb 12.8 oz)   08/18/18 99.3 kg (219 lb)        Intake/Output Summary (Last 24 hours) at 9/1/2021 1400  Last data filed at 9/1/2021 1100  Gross per 24 hour   Intake 1110.08 ml   Output --   Net 1110.08 ml     Diet Regular; Consistent Carbohydrate  ----------------------------------------------------------------------------------------------------------------------      Physical Exam:    Deferred due to COVID-19 isolation.  ----------------------------------------------------------------------------------------------------------------------  Results from last 7 days   Lab Units 08/29/21  1843 08/29/21  1634   TROPONIN T ng/mL <0.010 <0.010         Results from last 7 days   Lab Units 08/29/21  1654   PH, ARTERIAL pH units 7.474*   PO2 ART mm Hg 53.0*   PCO2, ARTERIAL mm Hg 33.1*   HCO3 ART mmol/L 24.3     Results from last 7 days   Lab Units 09/01/21  1043 08/31/21  0449 08/30/21  0152 08/29/21 2001 08/29/21  1634   CRP mg/dL 1.48* 2.22* 5.00*  --    < >   LACTATE  mmol/L  --  2.6* 2.9* 4.3*  --    WBC 10*3/mm3 8.05 8.04 6.46  --    < >   HEMOGLOBIN g/dL 13.2 12.1 13.0  --    < >   HEMATOCRIT % 40.6 37.6 38.5  --    < >   MCV fL 87.7 88.9 86.9  --    < >   MCHC g/dL 32.5 32.2 33.8  --    < >   PLATELETS 10*3/mm3 265 222 190  --    < >    < > = values in this interval not displayed.     Results from last 7 days   Lab Units 09/01/21  1043 08/31/21  0449 08/30/21  0152   SODIUM mmol/L 131* 138 137   POTASSIUM mmol/L 4.0 4.0 4.4   MAGNESIUM mg/dL 2.1 1.8 2.0   CHLORIDE mmol/L 96* 101 100   CO2 mmol/L 22.7 24.6 22.0   BUN mg/dL 23* 24* 19   CREATININE mg/dL 0.68 0.72 0.69   EGFR IF NONAFRICN AM mL/min/1.73 89 83 87   CALCIUM mg/dL 9.1 8.7 8.8   GLUCOSE mg/dL 106* 112* 195*   ALBUMIN g/dL 3.23* 3.41* 3.42*   BILIRUBIN mg/dL 0.4 0.3 0.3   ALK PHOS U/L 41 34* 35*   AST (SGOT) U/L 100* 89* 106*   ALT (SGPT) U/L 77* 74* 94*   Estimated Creatinine Clearance: 110.2 mL/min (by C-G formula based on SCr of 0.68 mg/dL).  No results found for: AMMONIA    Hemoglobin A1C   Date/Time Value Ref Range Status   08/30/2021 0152 6.40 (H) 4.80 - 5.60 % Final     Glucose   Date/Time Value Ref Range Status   09/01/2021 1136 121 70 - 130 mg/dL Final     Comment:     Meter: [id number] : [id number] santino kauffman   09/01/2021 0635 104 70 - 130 mg/dL Final     Comment:     Meter: [id number] : [id number] EMMETT MATTHEWS   08/31/2021 2104 137 (H) 70 - 130 mg/dL Final     Comment:     Meter: [id number] : [id number] MONICA CODY   08/31/2021 1648 160 (H) 70 - 130 mg/dL Final     Comment:     Meter: [id number] : [id number] [facility]   08/31/2021 1129 115 70 - 130 mg/dL Final     Comment:     Meter: [id number] : [id number] [facility]   08/31/2021 0637 89 70 - 130 mg/dL Final     Comment:     Meter: [id number] : [id number] EMMETT MATTHEWS   08/30/2021 2022 137 (H) 70 - 130 mg/dL Final     Comment:     Meter: [id number] : [id number] MONICA CODY   08/30/2021 1655 128 70 - 130  mg/dL Final     Comment:     Meter: [id number] : [id number] EMMETT MATTHEWS     Lab Results   Component Value Date    HGBA1C 6.40 (H) 08/30/2021     Lab Results   Component Value Date    TSH 0.561 08/30/2021       Blood Culture   Date Value Ref Range Status   08/29/2021 No growth at 24 hours  Preliminary   08/29/2021 No growth at 24 hours  Preliminary     No results found for: URINECX  No results found for: WOUNDCX  No results found for: STOOLCX  No results found for: RESPCX  Pain Management Panel       Pain Management Panel Latest Ref Rng & Units 8/29/2021    AMPHETAMINES SCREEN, URINE Negative Negative    BARBITURATES SCREEN Negative Negative    BENZODIAZEPINE SCREEN, URINE Negative Negative    BUPRENORPHINEUR Negative Negative    COCAINE SCREEN, URINE Negative Negative    METHADONE SCREEN, URINE Negative Negative              ----------------------------------------------------------------------------------------------------------------------  Imaging Results (Last 24 Hours)       ** No results found for the last 24 hours. **            ----------------------------------------------------------------------------------------------------------------------    Assessment/Plan       Assessment/Plan     ASSESSMENT:    1.  Severe sepsis, now resolved  2.  COVID-19 pneumonia    PLAN:    Discussed case with primary RN.  Patient's oxygen requirements remain at 6 L nasal cannula today.  Patient has had some shortness of breath this morning but otherwise denies any complaints at this time.  Afebrile, no diarrhea.  CRP is improved at 1.48.  WBC remains normal.    CT chest with PE protocol on 8/29/2021 showed multifocal predominantly peripheral groundglass opacities greater within the lower lung zones.  Commonly reported imaging features of COVID-19 pneumonia are present.  Other processes such as influenza pneumonia and organizing pneumonia can cause a similar imaging pattern.  No evidence of PE.  Hepatic steatosis.  Chest  x-ray on 8/29/2021 showed no acute cardiopulmonary findings.  COVID-19 and flu A/B PCR on 8/29/2021 detected COVID-19.  Blood cultures on 8/29/2021 are so far showing no growth.     We are agreeable with current coverage of Decadron and remdesivir.     Would recommend to hold antibiotic therapy at this time as patient is presenting with a typical COVID-19 infection without evidence of superimposed bacterial pneumonia.  We will follow closely and adjust therapy as appropriate.    Code Status:   Code Status and Medical Interventions:   Ordered at: 08/29/21 2112     Code Status:    CPR     Medical Interventions (Level of Support Prior to Arrest):    Full     Patient remains in COVID isolation and hence was contacted today by telephone. Case discussed at length with primary RN and overnight findings noted. Vitals signs, pertinent laboratory values and radiological studies were reviewed.    Scribed for Kerri Torres MD by DORIS Cervantes. 9/1/2021  14:00 EDT    DORIS Cervantes  09/01/21  14:00 EDT    Physician Attestation:    The documentation recorded by the scribe accurately reflects the service I personally performed and the decisions made by me.    Kerri Torres MD  09/01/21  14:00 EDT

## 2021-09-01 NOTE — CASE MANAGEMENT/SOCIAL WORK
Continued Stay Note  MAMTA Huang     Patient Name: Melyssa Reagan  MRN: [id number]  Today's Date: 9/1/2021    Admit Date: 8/29/2021    Discharge Plan     Row Name 09/01/21 1405       Plan    Plan  CM spoke with pt via phone for f/u visit.  She still plans to return  home alone at discharge.  She is requesting a new glucometer at d/c.  If she qualifies for oxygen this will be arranged with MD order at d/c.  Her family will take her home at d/c.    Provided Post Acute Provider Quality & Resource List?  Yes    Plan Comments  Pt remains in enhanced isolation with O2 @ 6 lpm & sat 94%; Remdesivir, Decadron, Lovenox; Lasix IV x 1; afeb; CRP 1.48; WBC 8.  Pt says she feels about the same but has noticed sl improvement with breathing after getting Lasix yesterday.  CM will follow.       Lizette Reyna RN

## 2021-09-01 NOTE — PROGRESS NOTES
Subjective     History:   Melyssa Reagan is a 60 y.o. female admitted on 8/29/2021 secondary to COVID-19     Procedures: None    CC: Follow up COVID-19    Patient seen and examined at bedside. Awake and alert. States she feels slightly improved today. Dyspnea improved from yesterday. Reports increased cough. No reported nausea or vomiting. Currently on 6L's NC. No acute events overnight per RN.     History taken from: patient, chart, and RN.      Objective     Vital Signs  Temp:  [97.5 °F (36.4 °C)-98.4 °F (36.9 °C)] 98.1 °F (36.7 °C)  Heart Rate:  [69-87] 87  Resp:  [16-20] 16  BP: (101-131)/(67-92) 114/67    Intake/Output Summary (Last 24 hours) at 8/31/2021 2023  Last data filed at 8/31/2021 1500  Gross per 24 hour   Intake 2090.88 ml   Output --   Net 2090.88 ml         Physical Exam:  General:    Awake, alert, in no acute distress   Heart:      Normal S1 and S2. Regular rate and rhythm. No significant murmur, rubs or gallops appreciated.   Lungs:     Respirations regular, even and unlabored. Crackles in left base. No wheezes, rales or rhonchi.   Abdomen:   Soft and nontender. No guarding, rebound tenderness or  organomegaly noted. Bowel sounds present x 4.   Extremities:  No clubbing, cyanosis or edema noted. Moves UE and LE equally B/L.     Results Review:    Results from last 7 days   Lab Units 08/31/21  0449 08/30/21  0152 08/29/21  1634   WBC 10*3/mm3 8.04 6.46 8.80   HEMOGLOBIN g/dL 12.1 13.0 14.2   PLATELETS 10*3/mm3 222 190 248     Results from last 7 days   Lab Units 08/31/21  0449 08/30/21  0152 08/29/21  1634   SODIUM mmol/L 138 137 135*   POTASSIUM mmol/L 4.0 4.4 4.1   CHLORIDE mmol/L 101 100 96*   CO2 mmol/L 24.6 22.0 21.6*   BUN mg/dL 24* 19 19   CREATININE mg/dL 0.72 0.69 0.82   CALCIUM mg/dL 8.7 8.8 9.3   GLUCOSE mg/dL 112* 195* 202*     Results from last 7 days   Lab Units 08/31/21  0449 08/30/21  0152 08/29/21  1634   BILIRUBIN mg/dL 0.3 0.3 0.4   ALK PHOS U/L 34* 35* 40   AST (SGOT) U/L 89*  106* 143*   ALT (SGPT) U/L 74* 94* 112*     Results from last 7 days   Lab Units 08/31/21  0449 08/30/21  0152   MAGNESIUM mg/dL 1.8 2.0         Results from last 7 days   Lab Units 08/29/21  1843 08/29/21  1634   TROPONIN T ng/mL <0.010 <0.010       Imaging Results (Last 24 Hours)     ** No results found for the last 24 hours. **            Medications:  albuterol sulfate HFA, 2 puff, Inhalation, 4x Daily - RT  buPROPion XL, 300 mg, Oral, Daily  citalopram, 10 mg, Oral, Daily  dexamethasone, 6 mg, Intravenous, Daily  enoxaparin, 0.5 mg/kg, Subcutaneous, Nightly  insulin aspart, 0-7 Units, Subcutaneous, TID AC  pantoprazole, 40 mg, Oral, Q AM  remdesivir, 100 mg, Intravenous, Q24H      Pharmacy to Dose enoxaparin (LOVENOX),             Assessment/Plan   Septic shock with lactate >4 upon admission: Likely 2/2 COVID-19 pneumonia. Afebrile and hemodynamically stable. WBC is stable. CRP improved. Lactate improving and will repeat labs in the AM. Blood cultures with NGTD. Cont treatment of COVID-19 as outlined below. ID input appreciated.     COVID-19 pneumonia: Cont remdesivir and decadron. Order a dose of Lasix to assist with lung compliance. Cont Lovenox. Cont supportive treatment.    Acute hypoxic respiratory failure:  Likely 2/2 above. No evidence of PE on CT chest. Cont treatment as outlined above.     Elevated liver enzymes: Likely 2/2 above. Reported hx of fatty liver disease. Viral hepatitis panel is non-reactive. Stable today. Cont to monitor.     DM II, non-insulin dependent: BG stable. Cont SSI with Accuchecks.     DVT PPX: Lovenox    Disposition: Likely home when medically stable.       Jose West DO  08/31/21  20:23 EDT

## 2021-09-02 LAB
ALBUMIN SERPL-MCNC: 3.4 G/DL (ref 3.5–5.2)
ALBUMIN/GLOB SERPL: 1.1 G/DL
ALP SERPL-CCNC: 53 U/L (ref 39–117)
ALT SERPL W P-5'-P-CCNC: 73 U/L (ref 1–33)
ANION GAP SERPL CALCULATED.3IONS-SCNC: 12.6 MMOL/L (ref 5–15)
AST SERPL-CCNC: 98 U/L (ref 1–32)
BASOPHILS # BLD AUTO: 0.04 10*3/MM3 (ref 0–0.2)
BASOPHILS NFR BLD AUTO: 0.6 % (ref 0–1.5)
BILIRUB SERPL-MCNC: 0.5 MG/DL (ref 0–1.2)
BUN SERPL-MCNC: 24 MG/DL (ref 6–20)
BUN/CREAT SERPL: 32.9 (ref 7–25)
CALCIUM SPEC-SCNC: 8.9 MG/DL (ref 8.6–10.5)
CHLORIDE SERPL-SCNC: 100 MMOL/L (ref 98–107)
CO2 SERPL-SCNC: 26.4 MMOL/L (ref 22–29)
CREAT SERPL-MCNC: 0.73 MG/DL (ref 0.57–1)
CRP SERPL-MCNC: 0.88 MG/DL (ref 0–0.5)
D-LACTATE SERPL-SCNC: 2 MMOL/L (ref 0.5–2)
DEPRECATED RDW RBC AUTO: 42.8 FL (ref 37–54)
EOSINOPHIL # BLD AUTO: 0.02 10*3/MM3 (ref 0–0.4)
EOSINOPHIL NFR BLD AUTO: 0.3 % (ref 0.3–6.2)
ERYTHROCYTE [DISTWIDTH] IN BLOOD BY AUTOMATED COUNT: 13.2 % (ref 12.3–15.4)
GFR SERPL CREATININE-BSD FRML MDRD: 82 ML/MIN/1.73
GLOBULIN UR ELPH-MCNC: 3.2 GM/DL
GLUCOSE BLDC GLUCOMTR-MCNC: 109 MG/DL (ref 70–130)
GLUCOSE BLDC GLUCOMTR-MCNC: 128 MG/DL (ref 70–130)
GLUCOSE BLDC GLUCOMTR-MCNC: 141 MG/DL (ref 70–130)
GLUCOSE BLDC GLUCOMTR-MCNC: 87 MG/DL (ref 70–130)
GLUCOSE SERPL-MCNC: 93 MG/DL (ref 65–99)
HCT VFR BLD AUTO: 40.2 % (ref 34–46.6)
HGB BLD-MCNC: 13 G/DL (ref 12–15.9)
IMM GRANULOCYTES # BLD AUTO: 0.29 10*3/MM3 (ref 0–0.05)
IMM GRANULOCYTES NFR BLD AUTO: 4.7 % (ref 0–0.5)
LYMPHOCYTES # BLD AUTO: 2.35 10*3/MM3 (ref 0.7–3.1)
LYMPHOCYTES NFR BLD AUTO: 37.7 % (ref 19.6–45.3)
MAGNESIUM SERPL-MCNC: 2 MG/DL (ref 1.6–2.6)
MCH RBC QN AUTO: 28.5 PG (ref 26.6–33)
MCHC RBC AUTO-ENTMCNC: 32.3 G/DL (ref 31.5–35.7)
MCV RBC AUTO: 88.2 FL (ref 79–97)
MONOCYTES # BLD AUTO: 0.46 10*3/MM3 (ref 0.1–0.9)
MONOCYTES NFR BLD AUTO: 7.4 % (ref 5–12)
NEUTROPHILS NFR BLD AUTO: 3.07 10*3/MM3 (ref 1.7–7)
NEUTROPHILS NFR BLD AUTO: 49.3 % (ref 42.7–76)
NRBC BLD AUTO-RTO: 0 /100 WBC (ref 0–0.2)
PLATELET # BLD AUTO: 296 10*3/MM3 (ref 140–450)
PMV BLD AUTO: 10.6 FL (ref 6–12)
POTASSIUM SERPL-SCNC: 3.6 MMOL/L (ref 3.5–5.2)
PROT SERPL-MCNC: 6.6 G/DL (ref 6–8.5)
RBC # BLD AUTO: 4.56 10*6/MM3 (ref 3.77–5.28)
SODIUM SERPL-SCNC: 139 MMOL/L (ref 136–145)
WBC # BLD AUTO: 6.23 10*3/MM3 (ref 3.4–10.8)

## 2021-09-02 PROCEDURE — 25010000002 ENOXAPARIN PER 10 MG: Performed by: INTERNAL MEDICINE

## 2021-09-02 PROCEDURE — 83735 ASSAY OF MAGNESIUM: CPT | Performed by: INTERNAL MEDICINE

## 2021-09-02 PROCEDURE — 86140 C-REACTIVE PROTEIN: CPT | Performed by: INTERNAL MEDICINE

## 2021-09-02 PROCEDURE — 25010000002 DEXAMETHASONE PER 1 MG: Performed by: INTERNAL MEDICINE

## 2021-09-02 PROCEDURE — 83605 ASSAY OF LACTIC ACID: CPT | Performed by: INTERNAL MEDICINE

## 2021-09-02 PROCEDURE — 94799 UNLISTED PULMONARY SVC/PX: CPT

## 2021-09-02 PROCEDURE — 82962 GLUCOSE BLOOD TEST: CPT

## 2021-09-02 PROCEDURE — 99232 SBSQ HOSP IP/OBS MODERATE 35: CPT | Performed by: INTERNAL MEDICINE

## 2021-09-02 PROCEDURE — 85025 COMPLETE CBC W/AUTO DIFF WBC: CPT | Performed by: INTERNAL MEDICINE

## 2021-09-02 PROCEDURE — 25010000002 FUROSEMIDE PER 20 MG: Performed by: INTERNAL MEDICINE

## 2021-09-02 PROCEDURE — 80053 COMPREHEN METABOLIC PANEL: CPT | Performed by: INTERNAL MEDICINE

## 2021-09-02 RX ORDER — FUROSEMIDE 10 MG/ML
40 INJECTION INTRAMUSCULAR; INTRAVENOUS ONCE
Status: COMPLETED | OUTPATIENT
Start: 2021-09-02 | End: 2021-09-02

## 2021-09-02 RX ADMIN — ALBUTEROL SULFATE 2 PUFF: 90 AEROSOL, METERED RESPIRATORY (INHALATION) at 11:14

## 2021-09-02 RX ADMIN — BUPROPION HYDROCHLORIDE 300 MG: 150 TABLET, FILM COATED, EXTENDED RELEASE ORAL at 08:29

## 2021-09-02 RX ADMIN — DEXAMETHASONE SODIUM PHOSPHATE 6 MG: 4 INJECTION, SOLUTION INTRA-ARTICULAR; INTRALESIONAL; INTRAMUSCULAR; INTRAVENOUS; SOFT TISSUE at 08:29

## 2021-09-02 RX ADMIN — GUAIFENESIN 1200 MG: 600 TABLET, EXTENDED RELEASE ORAL at 08:29

## 2021-09-02 RX ADMIN — REMDESIVIR 100 MG: 100 INJECTION, POWDER, LYOPHILIZED, FOR SOLUTION INTRAVENOUS at 00:32

## 2021-09-02 RX ADMIN — ALBUTEROL SULFATE 2 PUFF: 90 AEROSOL, METERED RESPIRATORY (INHALATION) at 20:55

## 2021-09-02 RX ADMIN — GUAIFENESIN 1200 MG: 600 TABLET, EXTENDED RELEASE ORAL at 20:56

## 2021-09-02 RX ADMIN — ENOXAPARIN SODIUM 50 MG: 60 INJECTION SUBCUTANEOUS at 20:56

## 2021-09-02 RX ADMIN — REMDESIVIR 100 MG: 100 INJECTION, POWDER, LYOPHILIZED, FOR SOLUTION INTRAVENOUS at 23:32

## 2021-09-02 RX ADMIN — CITALOPRAM HYDROBROMIDE 10 MG: 20 TABLET ORAL at 08:29

## 2021-09-02 RX ADMIN — PANTOPRAZOLE SODIUM 40 MG: 40 TABLET, DELAYED RELEASE ORAL at 05:37

## 2021-09-02 RX ADMIN — ALBUTEROL SULFATE 2 PUFF: 90 AEROSOL, METERED RESPIRATORY (INHALATION) at 08:19

## 2021-09-02 RX ADMIN — ACETAMINOPHEN 650 MG: 325 TABLET ORAL at 20:56

## 2021-09-02 RX ADMIN — FUROSEMIDE 40 MG: 10 INJECTION INTRAMUSCULAR; INTRAVENOUS at 08:34

## 2021-09-02 RX ADMIN — SODIUM CHLORIDE, PRESERVATIVE FREE 10 ML: 5 INJECTION INTRAVENOUS at 08:29

## 2021-09-02 NOTE — PROGRESS NOTES
PROGRESS NOTE         Patient Identification:  Name:  Melyssa Reagan  Age:  60 y.o.  Sex:  female  :  1960  MRN:  [id number]  Visit Number:  [id number]  Primary Care Provider:  Georgina Ramos APRN         LOS: 4 days       ----------------------------------------------------------------------------------------------------------------------  Subjective       Chief Complaints:    Shortness of Breath and Exposure To Known Illness        Interval History:      Patient continues on 6 L per nasal cannula today with no apparent distress.  Afebrile.  WBC normal.  CRP improving at 0.88.     Review of Systems:    Constitutional: no fever, chills and night sweats. No unexpected weight change.  Fatigue.  Eyes: no eye drainage, itching or redness.  HEENT: no mouth sores, dysphagia or nose bleed.  Loss of taste and smell.  Respiratory: Shortness of breath and nonproductive cough.  Cardiovascular: no chest pain, no palpitations, no orthopnea.  Gastrointestinal: no nausea, vomiting or diarrhea. No abdominal pain, hematemesis or rectal bleeding.    Genitourinary: no dysuria or polyuria.  Hematologic/lymphatic: no lymph node abnormalities, no easy bruising or easy bleeding.  Musculoskeletal: no muscle or joint pain.  Skin: No rash and no itching.  Neurological: no loss of consciousness, no seizure, no headache.  Behavioral/Psych: no depression or suicidal ideation.  Endocrine: no hot flashes.  Immunologic: negative.     ----------------------------------------------------------------------------------------------------------------------      Objective       Current Hospital Meds:  albuterol sulfate HFA, 2 puff, Inhalation, 4x Daily - RT  buPROPion XL, 300 mg, Oral, Daily  citalopram, 10 mg, Oral, Daily  dexamethasone, 6 mg, Intravenous, Daily  enoxaparin, 0.5 mg/kg, Subcutaneous, Nightly  estradiol, 10 mcg, Vaginal, Once per day on   guaiFENesin, 1,200 mg, Oral, Q12H  insulin aspart, 0-7 Units,  Subcutaneous, TID AC  pantoprazole, 40 mg, Oral, Q AM  remdesivir, 100 mg, Intravenous, Q24H      Pharmacy to Dose enoxaparin (LOVENOX),       ----------------------------------------------------------------------------------------------------------------------    Vital Signs:  Temp:  [96.7 °F (35.9 °C)-98.9 °F (37.2 °C)] 97.5 °F (36.4 °C)  Heart Rate:  [66-84] 69  Resp:  [14-20] 14  BP: ()/(66-89) 110/80  No data found.  SpO2 Percentage    09/02/21 0650 09/02/21 0819 09/02/21 1118   SpO2: 90% 92% 93%     SpO2:  [90 %-96 %] 93 %  on  Flow (L/min):  [6] 6;   Device (Oxygen Therapy): nasal cannula    Body mass index is 39.74 kg/m².  Wt Readings from Last 3 Encounters:   08/29/21 108 kg (238 lb 12.8 oz)   08/18/18 99.3 kg (219 lb)        Intake/Output Summary (Last 24 hours) at 9/2/2021 1326  Last data filed at 9/2/2021 1118  Gross per 24 hour   Intake 1050 ml   Output --   Net 1050 ml     Diet Regular; Consistent Carbohydrate  ----------------------------------------------------------------------------------------------------------------------      Physical Exam:    Deferred due to COVID-19 isolation.  ----------------------------------------------------------------------------------------------------------------------  Results from last 7 days   Lab Units 08/29/21  1843 08/29/21  1634   TROPONIN T ng/mL <0.010 <0.010         Results from last 7 days   Lab Units 08/29/21  1654   PH, ARTERIAL pH units 7.474*   PO2 ART mm Hg 53.0*   PCO2, ARTERIAL mm Hg 33.1*   HCO3 ART mmol/L 24.3     Results from last 7 days   Lab Units 09/02/21  0713 09/01/21  1043 08/31/21  0449 08/30/21  0152 08/30/21  0152   CRP mg/dL 0.88* 1.48* 2.22*   < > 5.00*   LACTATE mmol/L 2.0  --  2.6*  --  2.9*   WBC 10*3/mm3 6.23 8.05 8.04   < > 6.46   HEMOGLOBIN g/dL 13.0 13.2 12.1   < > 13.0   HEMATOCRIT % 40.2 40.6 37.6   < > 38.5   MCV fL 88.2 87.7 88.9   < > 86.9   MCHC g/dL 32.3 32.5 32.2   < > 33.8   PLATELETS 10*3/mm3 296 265 222   < > 190     < > = values in this interval not displayed.     Results from last 7 days   Lab Units 09/02/21  0713 09/01/21  1043 08/31/21  0449   SODIUM mmol/L 139 131* 138   POTASSIUM mmol/L 3.6 4.0 4.0   MAGNESIUM mg/dL 2.0 2.1 1.8   CHLORIDE mmol/L 100 96* 101   CO2 mmol/L 26.4 22.7 24.6   BUN mg/dL 24* 23* 24*   CREATININE mg/dL 0.73 0.68 0.72   EGFR IF NONAFRICN AM mL/min/1.73 82 89 83   CALCIUM mg/dL 8.9 9.1 8.7   GLUCOSE mg/dL 93 106* 112*   ALBUMIN g/dL 3.40* 3.23* 3.41*   BILIRUBIN mg/dL 0.5 0.4 0.3   ALK PHOS U/L 53 41 34*   AST (SGOT) U/L 98* 100* 89*   ALT (SGPT) U/L 73* 77* 74*   Estimated Creatinine Clearance: 102.6 mL/min (by C-G formula based on SCr of 0.73 mg/dL).  No results found for: AMMONIA    Glucose   Date/Time Value Ref Range Status   09/02/2021 1112 128 70 - 130 mg/dL Final     Comment:     Meter: [id number] : [id number] Jefferson Bennett   09/02/2021 0708 87 70 - 130 mg/dL Final     Comment:     Meter: [id number] : [id number] Humza Walls   09/01/2021 2109 126 70 - 130 mg/dL Final     Comment:     Meter: [id number] : [id number] MARLENE ANDRADE   09/01/2021 1704 166 (H) 70 - 130 mg/dL Final     Comment:     Meter: [id number] : [id number] EMMETT MATTHEWS   09/01/2021 1136 121 70 - 130 mg/dL Final     Comment:     Meter: [id number] : [id number] santino kauffman   09/01/2021 0635 104 70 - 130 mg/dL Final     Comment:     Meter: [id number] : [id number] EMMETT MATTHEWS   08/31/2021 2104 137 (H) 70 - 130 mg/dL Final     Comment:     Meter: [id number] : [id number] MONICA CODY   08/31/2021 1648 160 (H) 70 - 130 mg/dL Final     Comment:     Meter: [id number] : [id number] Ashley Hinojosa     Lab Results   Component Value Date    HGBA1C 6.40 (H) 08/30/2021     Lab Results   Component Value Date    TSH 0.561 08/30/2021       Blood Culture   Date Value Ref Range Status   08/29/2021 No growth at 24 hours  Preliminary   08/29/2021 No growth at 24 hours  Preliminary     No results found for:  URINECX  No results found for: WOUNDCX  No results found for: STOOLCX  No results found for: RESPCX  Pain Management Panel       Pain Management Panel Latest Ref Rng & Units 8/29/2021    AMPHETAMINES SCREEN, URINE Negative Negative    BARBITURATES SCREEN Negative Negative    BENZODIAZEPINE SCREEN, URINE Negative Negative    BUPRENORPHINEUR Negative Negative    COCAINE SCREEN, URINE Negative Negative    METHADONE SCREEN, URINE Negative Negative              ----------------------------------------------------------------------------------------------------------------------  Imaging Results (Last 24 Hours)       ** No results found for the last 24 hours. **            ----------------------------------------------------------------------------------------------------------------------    Assessment/Plan       Assessment/Plan     ASSESSMENT:    1.  Severe sepsis, now resolved  2.  COVID-19 pneumonia    PLAN:    Patient continues on 6 L per nasal cannula today with no apparent distress.  Afebrile.  WBC normal.  CRP improving at 0.88.     CT chest with PE protocol on 8/29/2021 showed multifocal predominantly peripheral groundglass opacities greater within the lower lung zones.  Commonly reported imaging features of COVID-19 pneumonia are present.  Other processes such as influenza pneumonia and organizing pneumonia can cause a similar imaging pattern.  No evidence of PE.  Hepatic steatosis.  Chest x-ray on 8/29/2021 showed no acute cardiopulmonary findings.  COVID-19 and flu A/B PCR on 8/29/2021 detected COVID-19.  Blood cultures on 8/29/2021 are so far showing no growth.     We are agreeable with current coverage of Decadron and remdesivir.     Would recommend to hold antibiotic therapy at this time as patient is presenting with a typical COVID-19 infection without evidence of superimposed bacterial pneumonia.  We will follow closely and adjust therapy as appropriate.    Code Status:   Code Status and Medical  Interventions:   Ordered at: 08/29/21 2112     Code Status:    CPR     Medical Interventions (Level of Support Prior to Arrest):    Full     Scribed for Dr. Kerri Torres MD by KIMANI Geller  09/02/21 13:27 EDT        KIMANI Geller  09/02/21  13:26 EDT    Physician Attestation:    The documentation recorded by the scribe accurately reflects the service I personally performed and the decisions made by me.    Kerri Torres MD  09/02/21  13:26 EDT

## 2021-09-02 NOTE — PROGRESS NOTES
Subjective     History:   Melyssa Reagan is a 60 y.o. female admitted on 8/29/2021 secondary to COVID-19     Procedures: None    CC: Follow up COVID-19    Patient seen and examined at bedside. Awake and alert. States she is feeling slightly improved. Reports chest congestion. Dyspnea overall improved. No reported nausea, vomiting or diarrhea. Remains on 6L's NC. No acute events overnight per RN.     History taken from: patient, chart, and RN.      Objective     Vital Signs  Temp:  [96 °F (35.6 °C)-98.9 °F (37.2 °C)] 98 °F (36.7 °C)  Heart Rate:  [63-84] 81  Resp:  [16-20] 18  BP: (114-141)/(66-79) 114/66    Intake/Output Summary (Last 24 hours) at 9/1/2021 2153  Last data filed at 9/1/2021 1500  Gross per 24 hour   Intake 1230.08 ml   Output --   Net 1230.08 ml         Physical Exam:  General:    Awake, alert, in no acute distress   Heart:      Normal S1 and S2. Regular rate and rhythm. No significant murmur, rubs or gallops appreciated.   Lungs:     Respirations regular, even and unlabored. Faint crackles in left base but improved. No wheezes, rales or rhonchi.   Abdomen:   Soft and nontender. No guarding, rebound tenderness or  organomegaly noted. Bowel sounds present x 4.   Extremities:  No clubbing, cyanosis or edema noted. Moves UE and LE equally B/L.     Results Review:    Results from last 7 days   Lab Units 09/01/21  1043 08/31/21  0449 08/30/21  0152 08/29/21  1634   WBC 10*3/mm3 8.05 8.04 6.46 8.80   HEMOGLOBIN g/dL 13.2 12.1 13.0 14.2   PLATELETS 10*3/mm3 265 222 190 248     Results from last 7 days   Lab Units 09/01/21  1043 08/31/21  0449 08/30/21  0152 08/29/21  1634   SODIUM mmol/L 131* 138 137 135*   POTASSIUM mmol/L 4.0 4.0 4.4 4.1   CHLORIDE mmol/L 96* 101 100 96*   CO2 mmol/L 22.7 24.6 22.0 21.6*   BUN mg/dL 23* 24* 19 19   CREATININE mg/dL 0.68 0.72 0.69 0.82   CALCIUM mg/dL 9.1 8.7 8.8 9.3   GLUCOSE mg/dL 106* 112* 195* 202*     Results from last 7 days   Lab Units 09/01/21  1044  08/31/21  0449 08/30/21  0152 08/29/21  1634   BILIRUBIN mg/dL 0.4 0.3 0.3 0.4   ALK PHOS U/L 41 34* 35* 40   AST (SGOT) U/L 100* 89* 106* 143*   ALT (SGPT) U/L 77* 74* 94* 112*     Results from last 7 days   Lab Units 09/01/21  1043 08/31/21  0449 08/30/21  0152   MAGNESIUM mg/dL 2.1 1.8 2.0         Results from last 7 days   Lab Units 08/29/21  1843 08/29/21  1634   TROPONIN T ng/mL <0.010 <0.010       Imaging Results (Last 24 Hours)     ** No results found for the last 24 hours. **            Medications:  albuterol sulfate HFA, 2 puff, Inhalation, 4x Daily - RT  buPROPion XL, 300 mg, Oral, Daily  citalopram, 10 mg, Oral, Daily  dexamethasone, 6 mg, Intravenous, Daily  enoxaparin, 0.5 mg/kg, Subcutaneous, Nightly  estradiol, 10 mcg, Vaginal, Once per day on Mon Thu  guaiFENesin, 1,200 mg, Oral, Q12H  insulin aspart, 0-7 Units, Subcutaneous, TID AC  pantoprazole, 40 mg, Oral, Q AM  remdesivir, 100 mg, Intravenous, Q24H      Pharmacy to Dose enoxaparin (LOVENOX),             Assessment/Plan   Septic shock with lactate >4 upon admission: Likely 2/2 COVID-19 pneumonia. Afebrile and hemodynamically stable. WBC is stable. CRP improved. Lactate improving and will repeat labs in the AM. Blood cultures with NGTD. Cont treatment of COVID-19 as outlined below. ID input appreciated.     COVID-19 pneumonia: Cont remdesivir and decadron. Order a dose of Lasix again today to assist with lung compliance. Cont Lovenox. Cont supportive treatment.    Acute hypoxic respiratory failure:  Likely 2/2 above. No evidence of PE on CT chest. Cont treatment as outlined above.     Elevated liver enzymes: Likely 2/2 above. Reported hx of fatty liver disease. Viral hepatitis panel is non-reactive. Stable today. Cont to monitor.     DM II, non-insulin dependent: BG stable. Cont SSI with Accuchecks.     DVT PPX: Lovenox    Disposition: Likely home when medically stable.       Jose West,   09/01/21  21:53 EDT

## 2021-09-02 NOTE — PLAN OF CARE
Goal Outcome Evaluation:        Patient resting gin bed.  Remains on 6L NC  No acute issues noted.

## 2021-09-02 NOTE — PLAN OF CARE
Goal Outcome Evaluation:  Plan of Care Reviewed With: patient        Progress: no change  Outcome Summary: Patient remains on 6L humidified NC throughout the shift. Continued to use Incentive Spirometer. No complaints this shift.

## 2021-09-03 LAB
ALBUMIN SERPL-MCNC: 3.41 G/DL (ref 3.5–5.2)
ALBUMIN/GLOB SERPL: 1 G/DL
ALP SERPL-CCNC: 36 U/L (ref 39–117)
ALT SERPL W P-5'-P-CCNC: 70 U/L (ref 1–33)
ANION GAP SERPL CALCULATED.3IONS-SCNC: 13.9 MMOL/L (ref 5–15)
AST SERPL-CCNC: 87 U/L (ref 1–32)
BACTERIA SPEC AEROBE CULT: NORMAL
BACTERIA SPEC AEROBE CULT: NORMAL
BILIRUB SERPL-MCNC: 0.5 MG/DL (ref 0–1.2)
BUN SERPL-MCNC: 24 MG/DL (ref 6–20)
BUN/CREAT SERPL: 32.4 (ref 7–25)
CALCIUM SPEC-SCNC: 8.9 MG/DL (ref 8.6–10.5)
CHLORIDE SERPL-SCNC: 99 MMOL/L (ref 98–107)
CO2 SERPL-SCNC: 24.1 MMOL/L (ref 22–29)
CREAT SERPL-MCNC: 0.74 MG/DL (ref 0.57–1)
CRP SERPL-MCNC: 0.67 MG/DL (ref 0–0.5)
D DIMER PPP FEU-MCNC: 0.4 MCGFEU/ML (ref 0–0.5)
DEPRECATED RDW RBC AUTO: 41.8 FL (ref 37–54)
EOSINOPHIL # BLD MANUAL: 0.35 10*3/MM3 (ref 0–0.4)
EOSINOPHIL NFR BLD MANUAL: 4 % (ref 0.3–6.2)
ERYTHROCYTE [DISTWIDTH] IN BLOOD BY AUTOMATED COUNT: 13.3 % (ref 12.3–15.4)
FIBRINOGEN PPP-MCNC: 412 MG/DL (ref 173–524)
GFR SERPL CREATININE-BSD FRML MDRD: 81 ML/MIN/1.73
GLOBULIN UR ELPH-MCNC: 3.3 GM/DL
GLUCOSE BLDC GLUCOMTR-MCNC: 127 MG/DL (ref 70–130)
GLUCOSE BLDC GLUCOMTR-MCNC: 169 MG/DL (ref 70–130)
GLUCOSE BLDC GLUCOMTR-MCNC: 181 MG/DL (ref 70–130)
GLUCOSE BLDC GLUCOMTR-MCNC: 87 MG/DL (ref 70–130)
GLUCOSE SERPL-MCNC: 116 MG/DL (ref 65–99)
HCT VFR BLD AUTO: 40.3 % (ref 34–46.6)
HGB BLD-MCNC: 13.4 G/DL (ref 12–15.9)
LDH SERPL-CCNC: 310 U/L (ref 135–214)
LYMPHOCYTES # BLD MANUAL: 2.78 10*3/MM3 (ref 0.7–3.1)
LYMPHOCYTES NFR BLD MANUAL: 32 % (ref 19.6–45.3)
LYMPHOCYTES NFR BLD MANUAL: 8 % (ref 5–12)
MCH RBC QN AUTO: 28.9 PG (ref 26.6–33)
MCHC RBC AUTO-ENTMCNC: 33.3 G/DL (ref 31.5–35.7)
MCV RBC AUTO: 87 FL (ref 79–97)
MONOCYTES # BLD AUTO: 0.7 10*3/MM3 (ref 0.1–0.9)
MYELOCYTES NFR BLD MANUAL: 3 % (ref 0–0)
NEUTROPHILS # BLD AUTO: 4.61 10*3/MM3 (ref 1.7–7)
NEUTROPHILS NFR BLD MANUAL: 48 % (ref 42.7–76)
NEUTS BAND NFR BLD MANUAL: 5 % (ref 0–5)
PLAT MORPH BLD: NORMAL
PLATELET # BLD AUTO: 339 10*3/MM3 (ref 140–450)
PMV BLD AUTO: 10.9 FL (ref 6–12)
POTASSIUM SERPL-SCNC: 3.5 MMOL/L (ref 3.5–5.2)
PROT SERPL-MCNC: 6.7 G/DL (ref 6–8.5)
RBC # BLD AUTO: 4.63 10*6/MM3 (ref 3.77–5.28)
RBC MORPH BLD: NORMAL
SODIUM SERPL-SCNC: 137 MMOL/L (ref 136–145)
WBC # BLD AUTO: 8.69 10*3/MM3 (ref 3.4–10.8)

## 2021-09-03 PROCEDURE — 83615 LACTATE (LD) (LDH) ENZYME: CPT | Performed by: INTERNAL MEDICINE

## 2021-09-03 PROCEDURE — 99232 SBSQ HOSP IP/OBS MODERATE 35: CPT | Performed by: INTERNAL MEDICINE

## 2021-09-03 PROCEDURE — 94799 UNLISTED PULMONARY SVC/PX: CPT

## 2021-09-03 PROCEDURE — 85007 BL SMEAR W/DIFF WBC COUNT: CPT | Performed by: INTERNAL MEDICINE

## 2021-09-03 PROCEDURE — 85379 FIBRIN DEGRADATION QUANT: CPT | Performed by: INTERNAL MEDICINE

## 2021-09-03 PROCEDURE — 80053 COMPREHEN METABOLIC PANEL: CPT | Performed by: INTERNAL MEDICINE

## 2021-09-03 PROCEDURE — 86140 C-REACTIVE PROTEIN: CPT | Performed by: INTERNAL MEDICINE

## 2021-09-03 PROCEDURE — 63710000001 INSULIN ASPART PER 5 UNITS: Performed by: PHYSICIAN ASSISTANT

## 2021-09-03 PROCEDURE — 82962 GLUCOSE BLOOD TEST: CPT

## 2021-09-03 PROCEDURE — 25010000002 ENOXAPARIN PER 10 MG: Performed by: INTERNAL MEDICINE

## 2021-09-03 PROCEDURE — 85025 COMPLETE CBC W/AUTO DIFF WBC: CPT | Performed by: INTERNAL MEDICINE

## 2021-09-03 PROCEDURE — 25010000002 FUROSEMIDE PER 20 MG: Performed by: INTERNAL MEDICINE

## 2021-09-03 PROCEDURE — 25010000002 DEXAMETHASONE PER 1 MG: Performed by: INTERNAL MEDICINE

## 2021-09-03 PROCEDURE — 85384 FIBRINOGEN ACTIVITY: CPT | Performed by: INTERNAL MEDICINE

## 2021-09-03 RX ORDER — FUROSEMIDE 10 MG/ML
40 INJECTION INTRAMUSCULAR; INTRAVENOUS ONCE
Status: COMPLETED | OUTPATIENT
Start: 2021-09-03 | End: 2021-09-03

## 2021-09-03 RX ADMIN — ALBUTEROL SULFATE 2 PUFF: 90 AEROSOL, METERED RESPIRATORY (INHALATION) at 20:40

## 2021-09-03 RX ADMIN — DEXAMETHASONE SODIUM PHOSPHATE 6 MG: 4 INJECTION, SOLUTION INTRA-ARTICULAR; INTRALESIONAL; INTRAMUSCULAR; INTRAVENOUS; SOFT TISSUE at 08:32

## 2021-09-03 RX ADMIN — BUPROPION HYDROCHLORIDE 300 MG: 150 TABLET, FILM COATED, EXTENDED RELEASE ORAL at 08:31

## 2021-09-03 RX ADMIN — ALBUTEROL SULFATE 2 PUFF: 90 AEROSOL, METERED RESPIRATORY (INHALATION) at 01:21

## 2021-09-03 RX ADMIN — FUROSEMIDE 40 MG: 10 INJECTION INTRAMUSCULAR; INTRAVENOUS at 10:32

## 2021-09-03 RX ADMIN — ALBUTEROL SULFATE 2 PUFF: 90 AEROSOL, METERED RESPIRATORY (INHALATION) at 13:31

## 2021-09-03 RX ADMIN — INSULIN ASPART 2 UNITS: 100 INJECTION, SOLUTION INTRAVENOUS; SUBCUTANEOUS at 17:23

## 2021-09-03 RX ADMIN — ENOXAPARIN SODIUM 50 MG: 60 INJECTION SUBCUTANEOUS at 20:36

## 2021-09-03 RX ADMIN — CITALOPRAM HYDROBROMIDE 10 MG: 20 TABLET ORAL at 08:31

## 2021-09-03 RX ADMIN — GUAIFENESIN 1200 MG: 600 TABLET, EXTENDED RELEASE ORAL at 20:38

## 2021-09-03 RX ADMIN — PANTOPRAZOLE SODIUM 40 MG: 40 TABLET, DELAYED RELEASE ORAL at 08:32

## 2021-09-03 RX ADMIN — ALBUTEROL SULFATE 2 PUFF: 90 AEROSOL, METERED RESPIRATORY (INHALATION) at 07:44

## 2021-09-03 RX ADMIN — GUAIFENESIN 1200 MG: 600 TABLET, EXTENDED RELEASE ORAL at 08:31

## 2021-09-03 NOTE — PROGRESS NOTES
PROGRESS NOTE         Patient Identification:  Name:  Melyssa Reagan  Age:  60 y.o.  Sex:  female  :  1960  MRN:  [id number]  Visit Number:  [id number]  Primary Care Provider:  Georgina Ramos APRN         LOS: 5 days       ----------------------------------------------------------------------------------------------------------------------  Subjective       Chief Complaints:    Shortness of Breath and Exposure To Known Illness        Interval History:      Patient on decreased oxygen requirement of 5 L per nasal cannula with no apparent distress.  Afebrile.  WBC normal.  CRP improving at 0.67.     Review of Systems:    Constitutional: no fever, chills and night sweats. No unexpected weight change.  Fatigue.  Eyes: no eye drainage, itching or redness.  HEENT: no mouth sores, dysphagia or nose bleed.  Loss of taste and smell.  Respiratory: Shortness of breath and nonproductive cough.  Cardiovascular: no chest pain, no palpitations, no orthopnea.  Gastrointestinal: no nausea, vomiting or diarrhea. No abdominal pain, hematemesis or rectal bleeding.    Genitourinary: no dysuria or polyuria.  Hematologic/lymphatic: no lymph node abnormalities, no easy bruising or easy bleeding.  Musculoskeletal: no muscle or joint pain.  Skin: No rash and no itching.  Neurological: no loss of consciousness, no seizure, no headache.  Behavioral/Psych: no depression or suicidal ideation.  Endocrine: no hot flashes.  Immunologic: negative.     ----------------------------------------------------------------------------------------------------------------------      Objective       Current Hospital Meds:  albuterol sulfate HFA, 2 puff, Inhalation, 4x Daily - RT  buPROPion XL, 300 mg, Oral, Daily  citalopram, 10 mg, Oral, Daily  dexamethasone, 6 mg, Intravenous, Daily  enoxaparin, 0.5 mg/kg, Subcutaneous, Nightly  estradiol, 10 mcg, Vaginal, Once per day on   guaiFENesin, 1,200 mg, Oral, Q12H  insulin aspart,  0-7 Units, Subcutaneous, TID AC  pantoprazole, 40 mg, Oral, Q AM      Pharmacy to Dose enoxaparin (LOVENOX),       ----------------------------------------------------------------------------------------------------------------------    Vital Signs:  Temp:  [97.6 °F (36.4 °C)-98.4 °F (36.9 °C)] 98 °F (36.7 °C)  Heart Rate:  [] 83  Resp:  [14-18] 18  BP: (108-133)/(63-86) 108/67  No data found.  SpO2 Percentage    09/03/21 0830 09/03/21 0831 09/03/21 1024   SpO2: 94% 95% 93%     SpO2:  [92 %-95 %] 93 %  on  Flow (L/min):  [5-6] 5;   Device (Oxygen Therapy): humidified;nasal cannula    Body mass index is 39.74 kg/m².  Wt Readings from Last 3 Encounters:   08/29/21 108 kg (238 lb 12.8 oz)   08/18/18 99.3 kg (219 lb)        Intake/Output Summary (Last 24 hours) at 9/3/2021 1131  Last data filed at 9/3/2021 1024  Gross per 24 hour   Intake 1440 ml   Output --   Net 1440 ml     Diet Regular; Consistent Carbohydrate  ----------------------------------------------------------------------------------------------------------------------      Physical Exam:    Deferred due to COVID-19 isolation.  ----------------------------------------------------------------------------------------------------------------------  Results from last 7 days   Lab Units 08/29/21  1843 08/29/21  1634   TROPONIN T ng/mL <0.010 <0.010         Results from last 7 days   Lab Units 08/29/21  1654   PH, ARTERIAL pH units 7.474*   PO2 ART mm Hg 53.0*   PCO2, ARTERIAL mm Hg 33.1*   HCO3 ART mmol/L 24.3     Results from last 7 days   Lab Units 09/03/21  0211 09/02/21  0713 09/01/21  1043 08/31/21  0449 08/31/21  0449 08/30/21  0152 08/30/21  0152   CRP mg/dL 0.67* 0.88* 1.48*   < > 2.22*   < > 5.00*   LACTATE mmol/L  --  2.0  --   --  2.6*  --  2.9*   WBC 10*3/mm3 8.69 6.23 8.05   < > 8.04   < > 6.46   HEMOGLOBIN g/dL 13.4 13.0 13.2   < > 12.1   < > 13.0   HEMATOCRIT % 40.3 40.2 40.6   < > 37.6   < > 38.5   MCV fL 87.0 88.2 87.7   < > 88.9   < > 86.9    MCHC g/dL 33.3 32.3 32.5   < > 32.2   < > 33.8   PLATELETS 10*3/mm3 339 296 265   < > 222   < > 190    < > = values in this interval not displayed.     Results from last 7 days   Lab Units 09/03/21  0211 09/02/21  0713 09/01/21  1043 08/31/21  0449 08/31/21  0449   SODIUM mmol/L 137 139 131*   < > 138   POTASSIUM mmol/L 3.5 3.6 4.0   < > 4.0   MAGNESIUM mg/dL  --  2.0 2.1  --  1.8   CHLORIDE mmol/L 99 100 96*   < > 101   CO2 mmol/L 24.1 26.4 22.7   < > 24.6   BUN mg/dL 24* 24* 23*   < > 24*   CREATININE mg/dL 0.74 0.73 0.68   < > 0.72   EGFR IF NONAFRICN AM mL/min/1.73 81 82 89   < > 83   CALCIUM mg/dL 8.9 8.9 9.1   < > 8.7   GLUCOSE mg/dL 116* 93 106*   < > 112*   ALBUMIN g/dL 3.41* 3.40* 3.23*   < > 3.41*   BILIRUBIN mg/dL 0.5 0.5 0.4   < > 0.3   ALK PHOS U/L 36* 53 41   < > 34*   AST (SGOT) U/L 87* 98* 100*   < > 89*   ALT (SGPT) U/L 70* 73* 77*   < > 74*    < > = values in this interval not displayed.   Estimated Creatinine Clearance: 101.3 mL/min (by C-G formula based on SCr of 0.74 mg/dL).  No results found for: AMMONIA    Glucose   Date/Time Value Ref Range Status   09/03/2021 1031 127 70 - 130 mg/dL Final     Comment:     Meter: [id number] : [id number] Jefferson Bennett   09/03/2021 0642 87 70 - 130 mg/dL Final     Comment:     Meter: [id number] : [id number] Humza Walls   09/02/2021 2331 109 70 - 130 mg/dL Final     Comment:     Meter: [id number] : [id number] Derrick Cox   09/02/2021 1656 141 (H) 70 - 130 mg/dL Final     Comment:     Meter: [id number] : [id number] Jefferson Bennett   09/02/2021 1112 128 70 - 130 mg/dL Final     Comment:     Meter: [id number] : [id number] Jefferson Bennett   09/02/2021 0708 87 70 - 130 mg/dL Final     Comment:     Meter: [id number] : [id number] Humza Walls   09/01/2021 2109 126 70 - 130 mg/dL Final     Comment:     Meter: [id number] : [id number] MARLENE ANDRADE   09/01/2021 1704 166 (H) 70 - 130 mg/dL Final     Comment:     Meter: [id number] : [id number]  EMMETT MATTHEWS     Lab Results   Component Value Date    HGBA1C 6.40 (H) 08/30/2021     Lab Results   Component Value Date    TSH 0.561 08/30/2021       Blood Culture   Date Value Ref Range Status   08/29/2021 No growth at 24 hours  Preliminary   08/29/2021 No growth at 24 hours  Preliminary     No results found for: URINECX  No results found for: WOUNDCX  No results found for: STOOLCX  No results found for: RESPCX  Pain Management Panel       Pain Management Panel Latest Ref Rng & Units 8/29/2021    AMPHETAMINES SCREEN, URINE Negative Negative    BARBITURATES SCREEN Negative Negative    BENZODIAZEPINE SCREEN, URINE Negative Negative    BUPRENORPHINEUR Negative Negative    COCAINE SCREEN, URINE Negative Negative    METHADONE SCREEN, URINE Negative Negative              ----------------------------------------------------------------------------------------------------------------------  Imaging Results (Last 24 Hours)       ** No results found for the last 24 hours. **            ----------------------------------------------------------------------------------------------------------------------    Assessment/Plan       Assessment/Plan     ASSESSMENT:    1.  Severe sepsis, now resolved  2.  COVID-19 pneumonia    PLAN:    Patient on decreased oxygen requirement of 5 L per nasal cannula with no apparent distress.  Afebrile.  WBC normal.  CRP improving at 0.67.     CT chest with PE protocol on 8/29/2021 showed multifocal predominantly peripheral groundglass opacities greater within the lower lung zones.  Commonly reported imaging features of COVID-19 pneumonia are present.  Other processes such as influenza pneumonia and organizing pneumonia can cause a similar imaging pattern.  No evidence of PE.  Hepatic steatosis.  Chest x-ray on 8/29/2021 showed no acute cardiopulmonary findings.  COVID-19 and flu A/B PCR on 8/29/2021 detected COVID-19.  Blood cultures on 8/29/2021 are so far showing no growth.     Patient  completed remdesivir.  Continues on Decadron.     Would recommend to hold antibiotic therapy at this time as patient is presenting with a typical COVID-19 infection without evidence of superimposed bacterial pneumonia.  We will follow closely and adjust therapy as appropriate.    Code Status:   Code Status and Medical Interventions:   Ordered at: 08/29/21 2112     Code Status:    CPR     Medical Interventions (Level of Support Prior to Arrest):    Full           KIMANI Geller  09/03/21  11:31 EDT

## 2021-09-03 NOTE — PROGRESS NOTES
Subjective     History:   Melyssa Reagna is a 60 y.o. female admitted on 8/29/2021 secondary to COVID-19     Procedures: None    CC: Follow up COVID-19    Patient seen and examined at bedside. Awake and alert. States she is slowly improving. Reports some improvement in her dyspnea and fatigue. Continues to report some chest congestion. No reported nausea, vomiting or diarrhea. Remains on 6L's NC. No acute events overnight per RN.     History taken from: patient, chart, and RN.      Objective     Vital Signs  Temp:  [96.7 °F (35.9 °C)-98.4 °F (36.9 °C)] 98.2 °F (36.8 °C)  Heart Rate:  [66-89] 89  Resp:  [14-20] 16  BP: ()/(68-89) 132/85    Intake/Output Summary (Last 24 hours) at 9/2/2021 2037  Last data filed at 9/2/2021 1749  Gross per 24 hour   Intake 1410 ml   Output --   Net 1410 ml         Physical Exam:  General:    Awake, alert, in no acute distress   Heart:      Normal S1 and S2. Regular rate and rhythm. No significant murmur, rubs or gallops appreciated.   Lungs:     Respirations regular, even and unlabored. Diminished breath sounds at bases. No wheezes, rales or rhonchi.   Abdomen:   Soft and nontender. No guarding, rebound tenderness or  organomegaly noted. Bowel sounds present x 4.   Extremities:  No clubbing, cyanosis or edema noted. Moves UE and LE equally B/L.     Results Review:    Results from last 7 days   Lab Units 09/02/21  0713 09/01/21  1043 08/31/21  0449 08/30/21  0152 08/29/21  1634   WBC 10*3/mm3 6.23 8.05 8.04 6.46 8.80   HEMOGLOBIN g/dL 13.0 13.2 12.1 13.0 14.2   PLATELETS 10*3/mm3 296 265 222 190 248     Results from last 7 days   Lab Units 09/02/21  0713 09/01/21  1043 08/31/21  0449 08/30/21  0152 08/29/21  1634   SODIUM mmol/L 139 131* 138 137 135*   POTASSIUM mmol/L 3.6 4.0 4.0 4.4 4.1   CHLORIDE mmol/L 100 96* 101 100 96*   CO2 mmol/L 26.4 22.7 24.6 22.0 21.6*   BUN mg/dL 24* 23* 24* 19 19   CREATININE mg/dL 0.73 0.68 0.72 0.69 0.82   CALCIUM mg/dL 8.9 9.1 8.7 8.8 9.3    GLUCOSE mg/dL 93 106* 112* 195* 202*     Results from last 7 days   Lab Units 09/02/21  0713 09/01/21  1043 08/31/21  0449 08/30/21  0152 08/29/21  1634   BILIRUBIN mg/dL 0.5 0.4 0.3 0.3 0.4   ALK PHOS U/L 53 41 34* 35* 40   AST (SGOT) U/L 98* 100* 89* 106* 143*   ALT (SGPT) U/L 73* 77* 74* 94* 112*     Results from last 7 days   Lab Units 09/02/21  0713 09/01/21  1043 08/31/21  0449 08/30/21  0152   MAGNESIUM mg/dL 2.0 2.1 1.8 2.0         Results from last 7 days   Lab Units 08/29/21  1843 08/29/21  1634   TROPONIN T ng/mL <0.010 <0.010       Imaging Results (Last 24 Hours)     ** No results found for the last 24 hours. **            Medications:  albuterol sulfate HFA, 2 puff, Inhalation, 4x Daily - RT  buPROPion XL, 300 mg, Oral, Daily  citalopram, 10 mg, Oral, Daily  dexamethasone, 6 mg, Intravenous, Daily  enoxaparin, 0.5 mg/kg, Subcutaneous, Nightly  estradiol, 10 mcg, Vaginal, Once per day on Mon Thu  guaiFENesin, 1,200 mg, Oral, Q12H  insulin aspart, 0-7 Units, Subcutaneous, TID AC  pantoprazole, 40 mg, Oral, Q AM  remdesivir, 100 mg, Intravenous, Q24H      Pharmacy to Dose enoxaparin (LOVENOX),             Assessment/Plan   Septic shock with lactate >4 upon admission: Likely 2/2 COVID-19 pneumonia. Afebrile and hemodynamically stable. WBC is stable. CRP improving.  Lactate has normalized. Blood cultures with NGTD. Cont treatment of COVID-19 as outlined below. ID input appreciated.     COVID-19 pneumonia: Cont remdesivir and decadron. Order a dose of Lasix again today to assist with lung compliance. Cont Lovenox. Cont supportive treatment.    Acute hypoxic respiratory failure:  Likely 2/2 above. No evidence of PE on CT chest. Cont treatment as outlined above. Wean supplemental O2 as tolerated.      Elevated liver enzymes: Likely 2/2 above. Reported hx of fatty liver disease. Viral hepatitis panel is non-reactive. Stable today. Cont to monitor.     DM II, non-insulin dependent: BG stable. Cont SSI with  Accuchecks.     DVT PPX: Lovenox    Disposition: Likely home when medically stable.       Jose West,   09/02/21  20:37 EDT

## 2021-09-03 NOTE — PLAN OF CARE
Goal Outcome Evaluation:  Plan of Care Reviewed With: patient        Progress: no change  Outcome Summary: Patient remains on 6L humidified NC this shift with oxygen level staying in the low 90's. Patient continued to use incentive spirometer throughout the shift and also laid prone for several hours at beginning of shift.

## 2021-09-03 NOTE — PLAN OF CARE
Goal Outcome Evaluation:     Patient down to 5L NC, tolerating well.  No other issues noted.

## 2021-09-04 LAB
ALBUMIN SERPL-MCNC: 3.35 G/DL (ref 3.5–5.2)
ALBUMIN/GLOB SERPL: 0.9 G/DL
ALP SERPL-CCNC: 36 U/L (ref 39–117)
ALT SERPL W P-5'-P-CCNC: 67 U/L (ref 1–33)
ANION GAP SERPL CALCULATED.3IONS-SCNC: 14.4 MMOL/L (ref 5–15)
AST SERPL-CCNC: 88 U/L (ref 1–32)
BILIRUB SERPL-MCNC: 0.6 MG/DL (ref 0–1.2)
BUN SERPL-MCNC: 20 MG/DL (ref 6–20)
BUN/CREAT SERPL: 27.8 (ref 7–25)
CALCIUM SPEC-SCNC: 8.9 MG/DL (ref 8.6–10.5)
CHLORIDE SERPL-SCNC: 97 MMOL/L (ref 98–107)
CO2 SERPL-SCNC: 22.6 MMOL/L (ref 22–29)
CREAT SERPL-MCNC: 0.72 MG/DL (ref 0.57–1)
CRP SERPL-MCNC: 0.6 MG/DL (ref 0–0.5)
DEPRECATED RDW RBC AUTO: 41.5 FL (ref 37–54)
EOSINOPHIL # BLD MANUAL: 0.09 10*3/MM3 (ref 0–0.4)
EOSINOPHIL NFR BLD MANUAL: 1 % (ref 0.3–6.2)
ERYTHROCYTE [DISTWIDTH] IN BLOOD BY AUTOMATED COUNT: 13.2 % (ref 12.3–15.4)
GFR SERPL CREATININE-BSD FRML MDRD: 83 ML/MIN/1.73
GLOBULIN UR ELPH-MCNC: 3.6 GM/DL
GLUCOSE BLDC GLUCOMTR-MCNC: 145 MG/DL (ref 70–130)
GLUCOSE BLDC GLUCOMTR-MCNC: 168 MG/DL (ref 70–130)
GLUCOSE BLDC GLUCOMTR-MCNC: 181 MG/DL (ref 70–130)
GLUCOSE BLDC GLUCOMTR-MCNC: 192 MG/DL (ref 70–130)
GLUCOSE SERPL-MCNC: 111 MG/DL (ref 65–99)
HCT VFR BLD AUTO: 41.5 % (ref 34–46.6)
HGB BLD-MCNC: 13.6 G/DL (ref 12–15.9)
LYMPHOCYTES # BLD MANUAL: 4.06 10*3/MM3 (ref 0.7–3.1)
LYMPHOCYTES NFR BLD MANUAL: 10 % (ref 5–12)
LYMPHOCYTES NFR BLD MANUAL: 46 % (ref 19.6–45.3)
MCH RBC QN AUTO: 28.3 PG (ref 26.6–33)
MCHC RBC AUTO-ENTMCNC: 32.8 G/DL (ref 31.5–35.7)
MCV RBC AUTO: 86.5 FL (ref 79–97)
METAMYELOCYTES NFR BLD MANUAL: 1 % (ref 0–0)
MONOCYTES # BLD AUTO: 0.88 10*3/MM3 (ref 0.1–0.9)
MYELOCYTES NFR BLD MANUAL: 5 % (ref 0–0)
NEUTROPHILS # BLD AUTO: 3.27 10*3/MM3 (ref 1.7–7)
NEUTROPHILS NFR BLD MANUAL: 33 % (ref 42.7–76)
NEUTS BAND NFR BLD MANUAL: 4 % (ref 0–5)
NEUTS VAC BLD QL SMEAR: ABNORMAL
PLATELET # BLD AUTO: 434 10*3/MM3 (ref 140–450)
PMV BLD AUTO: 9.8 FL (ref 6–12)
POTASSIUM SERPL-SCNC: 3.8 MMOL/L (ref 3.5–5.2)
PROT SERPL-MCNC: 6.9 G/DL (ref 6–8.5)
RBC # BLD AUTO: 4.8 10*6/MM3 (ref 3.77–5.28)
RBC MORPH BLD: NORMAL
SCAN SLIDE: NORMAL
SMALL PLATELETS BLD QL SMEAR: ABNORMAL
SODIUM SERPL-SCNC: 134 MMOL/L (ref 136–145)
WBC # BLD AUTO: 8.83 10*3/MM3 (ref 3.4–10.8)

## 2021-09-04 PROCEDURE — 94799 UNLISTED PULMONARY SVC/PX: CPT

## 2021-09-04 PROCEDURE — 80053 COMPREHEN METABOLIC PANEL: CPT | Performed by: INTERNAL MEDICINE

## 2021-09-04 PROCEDURE — 82962 GLUCOSE BLOOD TEST: CPT

## 2021-09-04 PROCEDURE — 99232 SBSQ HOSP IP/OBS MODERATE 35: CPT | Performed by: INTERNAL MEDICINE

## 2021-09-04 PROCEDURE — 25010000002 ENOXAPARIN PER 10 MG: Performed by: INTERNAL MEDICINE

## 2021-09-04 PROCEDURE — 85025 COMPLETE CBC W/AUTO DIFF WBC: CPT | Performed by: INTERNAL MEDICINE

## 2021-09-04 PROCEDURE — 25010000002 DEXAMETHASONE PER 1 MG: Performed by: INTERNAL MEDICINE

## 2021-09-04 PROCEDURE — 85007 BL SMEAR W/DIFF WBC COUNT: CPT | Performed by: INTERNAL MEDICINE

## 2021-09-04 PROCEDURE — 63710000001 INSULIN ASPART PER 5 UNITS: Performed by: PHYSICIAN ASSISTANT

## 2021-09-04 PROCEDURE — 86140 C-REACTIVE PROTEIN: CPT | Performed by: INTERNAL MEDICINE

## 2021-09-04 PROCEDURE — 25010000002 FUROSEMIDE PER 20 MG: Performed by: INTERNAL MEDICINE

## 2021-09-04 RX ORDER — FUROSEMIDE 10 MG/ML
40 INJECTION INTRAMUSCULAR; INTRAVENOUS ONCE
Status: COMPLETED | OUTPATIENT
Start: 2021-09-04 | End: 2021-09-04

## 2021-09-04 RX ADMIN — GUAIFENESIN 1200 MG: 600 TABLET, EXTENDED RELEASE ORAL at 20:11

## 2021-09-04 RX ADMIN — ALBUTEROL SULFATE 2 PUFF: 90 AEROSOL, METERED RESPIRATORY (INHALATION) at 19:06

## 2021-09-04 RX ADMIN — INSULIN ASPART 2 UNITS: 100 INJECTION, SOLUTION INTRAVENOUS; SUBCUTANEOUS at 12:59

## 2021-09-04 RX ADMIN — FUROSEMIDE 40 MG: 10 INJECTION INTRAMUSCULAR; INTRAVENOUS at 09:31

## 2021-09-04 RX ADMIN — GUAIFENESIN 1200 MG: 600 TABLET, EXTENDED RELEASE ORAL at 09:31

## 2021-09-04 RX ADMIN — DEXAMETHASONE SODIUM PHOSPHATE 6 MG: 4 INJECTION, SOLUTION INTRA-ARTICULAR; INTRALESIONAL; INTRAMUSCULAR; INTRAVENOUS; SOFT TISSUE at 09:31

## 2021-09-04 RX ADMIN — BUPROPION HYDROCHLORIDE 300 MG: 150 TABLET, FILM COATED, EXTENDED RELEASE ORAL at 09:31

## 2021-09-04 RX ADMIN — INSULIN ASPART 2 UNITS: 100 INJECTION, SOLUTION INTRAVENOUS; SUBCUTANEOUS at 17:51

## 2021-09-04 RX ADMIN — CITALOPRAM HYDROBROMIDE 10 MG: 20 TABLET ORAL at 09:31

## 2021-09-04 RX ADMIN — ALBUTEROL SULFATE 2 PUFF: 90 AEROSOL, METERED RESPIRATORY (INHALATION) at 12:35

## 2021-09-04 RX ADMIN — ACETAMINOPHEN 650 MG: 325 TABLET ORAL at 16:26

## 2021-09-04 RX ADMIN — ALBUTEROL SULFATE 2 PUFF: 90 AEROSOL, METERED RESPIRATORY (INHALATION) at 07:33

## 2021-09-04 RX ADMIN — ENOXAPARIN SODIUM 50 MG: 60 INJECTION SUBCUTANEOUS at 20:12

## 2021-09-04 RX ADMIN — PANTOPRAZOLE SODIUM 40 MG: 40 TABLET, DELAYED RELEASE ORAL at 06:44

## 2021-09-04 RX ADMIN — ALBUTEROL SULFATE 2 PUFF: 90 AEROSOL, METERED RESPIRATORY (INHALATION) at 04:09

## 2021-09-04 NOTE — PROGRESS NOTES
PROGRESS NOTE         Patient Identification:  Name:  Melyssa Reagan  Age:  60 y.o.  Sex:  female  :  1960  MRN:  [id number]  Visit Number:  [id number]  Primary Care Provider:  Georgina Ramos APRN         LOS: 6 days       ----------------------------------------------------------------------------------------------------------------------  Subjective       Chief Complaints:    Shortness of Breath and Exposure To Known Illness        Interval History:      Patient on decreased oxygen requirement of 2 L per nasal cannula today with no apparent distress.  Afebrile.  WBC normal.  CRP improving at 0.60.    Review of Systems:    Constitutional: no fever, chills and night sweats. No unexpected weight change.  Fatigue.  Eyes: no eye drainage, itching or redness.  HEENT: no mouth sores, dysphagia or nose bleed.  Loss of taste and smell.  Respiratory: Shortness of breath and nonproductive cough.  Cardiovascular: no chest pain, no palpitations, no orthopnea.  Gastrointestinal: no nausea, vomiting or diarrhea. No abdominal pain, hematemesis or rectal bleeding.    Genitourinary: no dysuria or polyuria.  Hematologic/lymphatic: no lymph node abnormalities, no easy bruising or easy bleeding.  Musculoskeletal: no muscle or joint pain.  Skin: No rash and no itching.  Neurological: no loss of consciousness, no seizure, no headache.  Behavioral/Psych: no depression or suicidal ideation.  Endocrine: no hot flashes.  Immunologic: negative.     ----------------------------------------------------------------------------------------------------------------------      Objective       Current [facility] Meds:  albuterol sulfate HFA, 2 puff, Inhalation, 4x Daily - RT  buPROPion XL, 300 mg, Oral, Daily  citalopram, 10 mg, Oral, Daily  dexamethasone, 6 mg, Intravenous, Daily  enoxaparin, 0.5 mg/kg, Subcutaneous, Nightly  estradiol, 10 mcg, Vaginal, Once per day on   guaiFENesin, 1,200 mg, Oral, Q12H  insulin  aspart, 0-7 Units, Subcutaneous, TID AC  pantoprazole, 40 mg, Oral, Q AM      Pharmacy to Dose enoxaparin (LOVENOX),       ----------------------------------------------------------------------------------------------------------------------    Vital Signs:  Temp:  [97.1 °F (36.2 °C)-98.3 °F (36.8 °C)] 97.1 °F (36.2 °C)  Heart Rate:  [] 83  Resp:  [18-20] 18  BP: (104-118)/(62-69) 106/64  No data found.  SpO2 Percentage    09/04/21 0939 09/04/21 0940 09/04/21 0945   SpO2: 95% 94% 91%     SpO2:  [91 %-97 %] 91 %  on  Flow (L/min):  [2-5] 2;   Device (Oxygen Therapy): nasal cannula;humidified    Body mass index is 39.74 kg/m².  Wt Readings from Last 3 Encounters:   08/29/21 108 kg (238 lb 12.8 oz)   08/18/18 99.3 kg (219 lb)        Intake/Output Summary (Last 24 hours) at 9/4/2021 1233  Last data filed at 9/4/2021 0500  Gross per 24 hour   Intake 540 ml   Output --   Net 540 ml     Diet Regular; Consistent Carbohydrate  ----------------------------------------------------------------------------------------------------------------------      Physical Exam:    Deferred due to COVID-19 isolation.  ----------------------------------------------------------------------------------------------------------------------  Results from last 7 days   Lab Units 08/29/21  1843 08/29/21  1634   TROPONIN T ng/mL <0.010 <0.010         Results from last 7 days   Lab Units 08/29/21  1654   PH, ARTERIAL pH units 7.474*   PO2 ART mm Hg 53.0*   PCO2, ARTERIAL mm Hg 33.1*   HCO3 ART mmol/L 24.3     Results from last 7 days   Lab Units 09/04/21  0430 09/03/21  0211 09/02/21  0713 09/01/21  1043 08/31/21  0449 08/30/21  0152 08/30/21  0152   CRP mg/dL 0.60* 0.67* 0.88*   < > 2.22*   < > 5.00*   LACTATE mmol/L  --   --  2.0  --  2.6*  --  2.9*   WBC 10*3/mm3 8.83 8.69 6.23   < > 8.04   < > 6.46   HEMOGLOBIN g/dL 13.6 13.4 13.0   < > 12.1   < > 13.0   HEMATOCRIT % 41.5 40.3 40.2   < > 37.6   < > 38.5   MCV fL 86.5 87.0 88.2   < > 88.9    < > 86.9   MCHC g/dL 32.8 33.3 32.3   < > 32.2   < > 33.8   PLATELETS 10*3/mm3 434 339 296   < > 222   < > 190    < > = values in this interval not displayed.     Results from last 7 days   Lab Units 09/04/21  0430 09/03/21  0211 09/02/21  0713 09/01/21  1043 09/01/21  1043 08/31/21  0449 08/31/21  0449   SODIUM mmol/L 134* 137 139   < > 131*   < > 138   POTASSIUM mmol/L 3.8 3.5 3.6   < > 4.0   < > 4.0   MAGNESIUM mg/dL  --   --  2.0  --  2.1  --  1.8   CHLORIDE mmol/L 97* 99 100   < > 96*   < > 101   CO2 mmol/L 22.6 24.1 26.4   < > 22.7   < > 24.6   BUN mg/dL 20 24* 24*   < > 23*   < > 24*   CREATININE mg/dL 0.72 0.74 0.73   < > 0.68   < > 0.72   EGFR IF NONAFRICN AM mL/min/1.73 83 81 82   < > 89   < > 83   CALCIUM mg/dL 8.9 8.9 8.9   < > 9.1   < > 8.7   GLUCOSE mg/dL 111* 116* 93   < > 106*   < > 112*   ALBUMIN g/dL 3.35* 3.41* 3.40*   < > 3.23*   < > 3.41*   BILIRUBIN mg/dL 0.6 0.5 0.5   < > 0.4   < > 0.3   ALK PHOS U/L 36* 36* 53   < > 41   < > 34*   AST (SGOT) U/L 88* 87* 98*   < > 100*   < > 89*   ALT (SGPT) U/L 67* 70* 73*   < > 77*   < > 74*    < > = values in this interval not displayed.   Estimated Creatinine Clearance: 104.1 mL/min (by C-G formula based on SCr of 0.72 mg/dL).  No results found for: AMMONIA    Glucose   Date/Time Value Ref Range Status   09/04/2021 1103 192 (H) 70 - 130 mg/dL Final     Comment:     Meter: [id number] : [id number] Jefefrson Bennett   09/04/2021 0642 145 (H) 70 - 130 mg/dL Final     Comment:     Meter: [id number] : [id number] Humza Walls   09/03/2021 2053 169 (H) 70 - 130 mg/dL Final     Comment:     Meter: [id number] : [id number] Lisa Soliman   09/03/2021 1632 181 (H) 70 - 130 mg/dL Final     Comment:     Meter: [id number] : [id number] Jefferson Bennett   09/03/2021 1031 127 70 - 130 mg/dL Final     Comment:     Meter: [id number] : [id number] Jefferson Bennett   09/03/2021 0642 87 70 - 130 mg/dL Final     Comment:     Meter: [id number] : [id number]  Humza Walls   09/02/2021 2331 109 70 - 130 mg/dL Final     Comment:     Meter: [id number] : [id number] Derrick Cox   09/02/2021 1656 141 (H) 70 - 130 mg/dL Final     Comment:     Meter: [id number] : [id number] Jefferson Bennett     Lab Results   Component Value Date    HGBA1C 6.40 (H) 08/30/2021     Lab Results   Component Value Date    TSH 0.561 08/30/2021       Blood Culture   Date Value Ref Range Status   08/29/2021 No growth at 24 hours  Preliminary   08/29/2021 No growth at 24 hours  Preliminary     No results found for: URINECX  No results found for: WOUNDCX  No results found for: STOOLCX  No results found for: RESPCX  Pain Management Panel       Pain Management Panel Latest Ref Rng & Units 8/29/2021    AMPHETAMINES SCREEN, URINE Negative Negative    BARBITURATES SCREEN Negative Negative    BENZODIAZEPINE SCREEN, URINE Negative Negative    BUPRENORPHINEUR Negative Negative    COCAINE SCREEN, URINE Negative Negative    METHADONE SCREEN, URINE Negative Negative              ----------------------------------------------------------------------------------------------------------------------  Imaging Results (Last 24 Hours)       ** No results found for the last 24 hours. **            ----------------------------------------------------------------------------------------------------------------------    Assessment/Plan       Assessment/Plan     ASSESSMENT:    1.  Severe sepsis, now resolved  2.  COVID-19 pneumonia    PLAN:      Patient on decreased oxygen requirement of 2 L per nasal cannula today with no apparent distress.  Afebrile.  WBC normal.  CRP improving at 0.60.    CT chest with PE protocol on 8/29/2021 showed multifocal predominantly peripheral groundglass opacities greater within the lower lung zones.  Commonly reported imaging features of COVID-19 pneumonia are present.  Other processes such as influenza pneumonia and organizing pneumonia can cause a similar imaging pattern.  No evidence of  PE.  Hepatic steatosis.  Chest x-ray on 8/29/2021 showed no acute cardiopulmonary findings.  COVID-19 and flu A/B PCR on 8/29/2021 detected COVID-19.  Blood cultures on 8/29/2021 are so far showing no growth.     Patient completed remdesivir.  Continues on Decadron.     Would recommend to hold antibiotic therapy at this time as patient is presenting with a typical COVID-19 infection without evidence of superimposed bacterial pneumonia.     Patient stable from ID standpoint.  Okay to discharge from ID standpoint.  ID will sign off for now.  Please contact us if we can further assist in this case.    Code Status:   Code Status and Medical Interventions:   Ordered at: 08/29/21 2112     Code Status:    CPR     Medical Interventions (Level of Support Prior to Arrest):    Full           KIMANI Geller  09/04/21  12:33 EDT

## 2021-09-04 NOTE — PROGRESS NOTES
Subjective     History:   Melyssa Reagan is a 60 y.o. female admitted on 8/29/2021 secondary to COVID-19     Procedures: None    CC: Follow up COVID-19    Patient seen and examined at bedside. Awake and alert. States she feels overall improved today with improving chest congestion and dyspnea. No reported nausea, vomiting or diarrhea. Continues to report good UOP with Lasix. O2 weaned to 5L's NC. No acute events overnight per RN.     History taken from: patient, chart, and RN.      Objective     Vital Signs  Temp:  [97.6 °F (36.4 °C)-98.4 °F (36.9 °C)] 98.1 °F (36.7 °C)  Heart Rate:  [] 100  Resp:  [14-20] 18  BP: (108-133)/(63-86) 118/68    Intake/Output Summary (Last 24 hours) at 9/3/2021 2012  Last data filed at 9/3/2021 1415  Gross per 24 hour   Intake 960 ml   Output --   Net 960 ml         Physical Exam:  General:    Awake, alert, in no acute distress   Heart:      Normal S1 and S2. Regular rate and rhythm. No significant murmur, rubs or gallops appreciated.   Lungs:     Respirations regular, even and unlabored. Diminished breath sounds at bases but aeration improved. No wheezes, rales or rhonchi.   Abdomen:   Soft and nontender. No guarding, rebound tenderness or  organomegaly noted. Bowel sounds present x 4.   Extremities:  No clubbing, cyanosis or edema noted. Moves UE and LE equally B/L.     Results Review:    Results from last 7 days   Lab Units 09/03/21 0211 09/02/21  0713 09/01/21  1043 08/31/21  0449 08/30/21  0152 08/29/21  1634   WBC 10*3/mm3 8.69 6.23 8.05 8.04 6.46 8.80   HEMOGLOBIN g/dL 13.4 13.0 13.2 12.1 13.0 14.2   PLATELETS 10*3/mm3 339 296 265 222 190 248     Results from last 7 days   Lab Units 09/03/21  0211 09/02/21  0713 09/01/21  1043 08/31/21  0449 08/30/21  0152 08/29/21  1634   SODIUM mmol/L 137 139 131* 138 137 135*   POTASSIUM mmol/L 3.5 3.6 4.0 4.0 4.4 4.1   CHLORIDE mmol/L 99 100 96* 101 100 96*   CO2 mmol/L 24.1 26.4 22.7 24.6 22.0 21.6*   BUN mg/dL 24* 24* 23* 24* 19 19    CREATININE mg/dL 0.74 0.73 0.68 0.72 0.69 0.82   CALCIUM mg/dL 8.9 8.9 9.1 8.7 8.8 9.3   GLUCOSE mg/dL 116* 93 106* 112* 195* 202*     Results from last 7 days   Lab Units 09/03/21  0211 09/02/21  0713 09/01/21  1043 08/31/21  0449 08/30/21  0152 08/29/21  1634   BILIRUBIN mg/dL 0.5 0.5 0.4 0.3 0.3 0.4   ALK PHOS U/L 36* 53 41 34* 35* 40   AST (SGOT) U/L 87* 98* 100* 89* 106* 143*   ALT (SGPT) U/L 70* 73* 77* 74* 94* 112*     Results from last 7 days   Lab Units 09/02/21  0713 09/01/21  1043 08/31/21  0449 08/30/21  0152   MAGNESIUM mg/dL 2.0 2.1 1.8 2.0         Results from last 7 days   Lab Units 08/29/21  1843 08/29/21  1634   TROPONIN T ng/mL <0.010 <0.010       Imaging Results (Last 24 Hours)     ** No results found for the last 24 hours. **            Medications:  albuterol sulfate HFA, 2 puff, Inhalation, 4x Daily - RT  buPROPion XL, 300 mg, Oral, Daily  citalopram, 10 mg, Oral, Daily  dexamethasone, 6 mg, Intravenous, Daily  enoxaparin, 0.5 mg/kg, Subcutaneous, Nightly  estradiol, 10 mcg, Vaginal, Once per day on Mon Thu  guaiFENesin, 1,200 mg, Oral, Q12H  insulin aspart, 0-7 Units, Subcutaneous, TID AC  pantoprazole, 40 mg, Oral, Q AM      Pharmacy to Dose enoxaparin (LOVENOX),             Assessment/Plan   Septic shock with lactate >4 upon admission: Likely 2/2 COVID-19 pneumonia. Afebrile and hemodynamically stable. WBC is stable. CRP improving.  Lactate has normalized. Blood cultures with NGTD. Cont treatment of COVID-19 as outlined below. ID input appreciated.     COVID-19 pneumonia: Completed remdesivir. Cont decadron. Order a dose of Lasix again today to assist with lung compliance as she continues to diurese well. Cont Lovenox. Cont supportive treatment.    Acute hypoxic respiratory failure:  Likely 2/2 above. No evidence of PE on CT chest. Cont treatment as outlined above. Wean supplemental O2 as tolerated.      Elevated liver enzymes: Likely 2/2 above. Reported hx of fatty liver disease. Viral  hepatitis panel is non-reactive. Stable today. Cont to monitor.     DM II, non-insulin dependent: BG stable. Cont SSI with Accuchecks.     DVT PPX: Lovenox    Disposition: Likely home when medically stable/improving O2 requirements.        Jose West,   09/03/21  20:12 EDT

## 2021-09-04 NOTE — PLAN OF CARE
Goal Outcome Evaluation:        Patient oxygen therapy titrated down to 2L NC.  Tolerating well.

## 2021-09-04 NOTE — PLAN OF CARE
Goal Outcome Evaluation:  Plan of Care Reviewed With: patient        Progress: improving  Outcome Summary: Patient reports improved breathing although not sleeping very well during shift. No acute distress noted will continue to monitor

## 2021-09-05 ENCOUNTER — READMISSION MANAGEMENT (OUTPATIENT)
Dept: CALL CENTER | Facility: HOSPITAL | Age: 61
End: 2021-09-05

## 2021-09-05 VITALS
SYSTOLIC BLOOD PRESSURE: 108 MMHG | HEART RATE: 88 BPM | TEMPERATURE: 97.9 F | RESPIRATION RATE: 20 BRPM | OXYGEN SATURATION: 93 % | BODY MASS INDEX: 39.79 KG/M2 | DIASTOLIC BLOOD PRESSURE: 80 MMHG | WEIGHT: 238.8 LBS | HEIGHT: 65 IN

## 2021-09-05 PROBLEM — D89.833 CYTOKINE RELEASE SYNDROME, GRADE 3: Status: ACTIVE | Noted: 2021-09-05

## 2021-09-05 LAB
ALBUMIN SERPL-MCNC: 3.51 G/DL (ref 3.5–5.2)
ALBUMIN/GLOB SERPL: 1 G/DL
ALP SERPL-CCNC: 37 U/L (ref 39–117)
ALT SERPL W P-5'-P-CCNC: 79 U/L (ref 1–33)
ANION GAP SERPL CALCULATED.3IONS-SCNC: 15.6 MMOL/L (ref 5–15)
AST SERPL-CCNC: 115 U/L (ref 1–32)
BASOPHILS # BLD AUTO: 0.05 10*3/MM3 (ref 0–0.2)
BASOPHILS NFR BLD AUTO: 0.5 % (ref 0–1.5)
BILIRUB SERPL-MCNC: 0.5 MG/DL (ref 0–1.2)
BUN SERPL-MCNC: 19 MG/DL (ref 6–20)
BUN/CREAT SERPL: 24.4 (ref 7–25)
CALCIUM SPEC-SCNC: 9.3 MG/DL (ref 8.6–10.5)
CHLORIDE SERPL-SCNC: 98 MMOL/L (ref 98–107)
CO2 SERPL-SCNC: 20.4 MMOL/L (ref 22–29)
CREAT SERPL-MCNC: 0.78 MG/DL (ref 0.57–1)
CRP SERPL-MCNC: 0.38 MG/DL (ref 0–0.5)
D DIMER PPP FEU-MCNC: <0.27 MCGFEU/ML (ref 0–0.5)
DEPRECATED RDW RBC AUTO: 42.1 FL (ref 37–54)
EOSINOPHIL # BLD AUTO: 0.28 10*3/MM3 (ref 0–0.4)
EOSINOPHIL NFR BLD AUTO: 2.6 % (ref 0.3–6.2)
ERYTHROCYTE [DISTWIDTH] IN BLOOD BY AUTOMATED COUNT: 13.4 % (ref 12.3–15.4)
FIBRINOGEN PPP-MCNC: 425 MG/DL (ref 173–524)
GFR SERPL CREATININE-BSD FRML MDRD: 76 ML/MIN/1.73
GLOBULIN UR ELPH-MCNC: 3.6 GM/DL
GLUCOSE BLDC GLUCOMTR-MCNC: 101 MG/DL (ref 70–130)
GLUCOSE SERPL-MCNC: 147 MG/DL (ref 65–99)
HCT VFR BLD AUTO: 42 % (ref 34–46.6)
HGB BLD-MCNC: 13.9 G/DL (ref 12–15.9)
IMM GRANULOCYTES # BLD AUTO: 0.5 10*3/MM3 (ref 0–0.05)
IMM GRANULOCYTES NFR BLD AUTO: 4.6 % (ref 0–0.5)
LDH SERPL-CCNC: 305 U/L (ref 135–214)
LYMPHOCYTES # BLD AUTO: 2.52 10*3/MM3 (ref 0.7–3.1)
LYMPHOCYTES NFR BLD AUTO: 23.2 % (ref 19.6–45.3)
MCH RBC QN AUTO: 28.9 PG (ref 26.6–33)
MCHC RBC AUTO-ENTMCNC: 33.1 G/DL (ref 31.5–35.7)
MCV RBC AUTO: 87.3 FL (ref 79–97)
MONOCYTES # BLD AUTO: 0.64 10*3/MM3 (ref 0.1–0.9)
MONOCYTES NFR BLD AUTO: 5.9 % (ref 5–12)
NEUTROPHILS NFR BLD AUTO: 6.86 10*3/MM3 (ref 1.7–7)
NEUTROPHILS NFR BLD AUTO: 63.2 % (ref 42.7–76)
NRBC BLD AUTO-RTO: 0 /100 WBC (ref 0–0.2)
PLATELET # BLD AUTO: 430 10*3/MM3 (ref 140–450)
PMV BLD AUTO: 10.4 FL (ref 6–12)
POTASSIUM SERPL-SCNC: 3.8 MMOL/L (ref 3.5–5.2)
PROT SERPL-MCNC: 7.1 G/DL (ref 6–8.5)
RBC # BLD AUTO: 4.81 10*6/MM3 (ref 3.77–5.28)
SODIUM SERPL-SCNC: 134 MMOL/L (ref 136–145)
WBC # BLD AUTO: 10.85 10*3/MM3 (ref 3.4–10.8)

## 2021-09-05 PROCEDURE — 85025 COMPLETE CBC W/AUTO DIFF WBC: CPT | Performed by: INTERNAL MEDICINE

## 2021-09-05 PROCEDURE — 86140 C-REACTIVE PROTEIN: CPT | Performed by: INTERNAL MEDICINE

## 2021-09-05 PROCEDURE — 85379 FIBRIN DEGRADATION QUANT: CPT | Performed by: INTERNAL MEDICINE

## 2021-09-05 PROCEDURE — 99239 HOSP IP/OBS DSCHRG MGMT >30: CPT | Performed by: INTERNAL MEDICINE

## 2021-09-05 PROCEDURE — 25010000002 DEXAMETHASONE PER 1 MG: Performed by: INTERNAL MEDICINE

## 2021-09-05 PROCEDURE — 94799 UNLISTED PULMONARY SVC/PX: CPT

## 2021-09-05 PROCEDURE — 82962 GLUCOSE BLOOD TEST: CPT

## 2021-09-05 PROCEDURE — 85384 FIBRINOGEN ACTIVITY: CPT | Performed by: INTERNAL MEDICINE

## 2021-09-05 PROCEDURE — 83615 LACTATE (LD) (LDH) ENZYME: CPT | Performed by: INTERNAL MEDICINE

## 2021-09-05 PROCEDURE — 80053 COMPREHEN METABOLIC PANEL: CPT | Performed by: INTERNAL MEDICINE

## 2021-09-05 RX ORDER — DEXAMETHASONE 6 MG/1
6 TABLET ORAL
Qty: 4 TABLET | Refills: 0 | Status: SHIPPED | OUTPATIENT
Start: 2021-09-06 | End: 2021-09-10

## 2021-09-05 RX ORDER — GUAIFENESIN 600 MG/1
1200 TABLET, EXTENDED RELEASE ORAL EVERY 12 HOURS SCHEDULED
Qty: 120 TABLET | Refills: 0 | Status: SHIPPED | OUTPATIENT
Start: 2021-09-05

## 2021-09-05 RX ORDER — BENZONATATE 100 MG/1
100 CAPSULE ORAL 3 TIMES DAILY PRN
Qty: 30 CAPSULE | Refills: 0 | Status: SHIPPED | OUTPATIENT
Start: 2021-09-05

## 2021-09-05 RX ORDER — ALBUTEROL SULFATE 90 UG/1
2 AEROSOL, METERED RESPIRATORY (INHALATION)
Qty: 6.7 G | Refills: 0 | Status: SHIPPED | OUTPATIENT
Start: 2021-09-05

## 2021-09-05 RX ORDER — GUAIFENESIN/DEXTROMETHORPHAN 100-10MG/5
5 SYRUP ORAL EVERY 4 HOURS PRN
Qty: 118 ML | Refills: 0 | Status: SHIPPED | OUTPATIENT
Start: 2021-09-05

## 2021-09-05 RX ADMIN — ALBUTEROL SULFATE 2 PUFF: 90 AEROSOL, METERED RESPIRATORY (INHALATION) at 00:11

## 2021-09-05 RX ADMIN — CITALOPRAM HYDROBROMIDE 10 MG: 20 TABLET ORAL at 08:55

## 2021-09-05 RX ADMIN — PANTOPRAZOLE SODIUM 40 MG: 40 TABLET, DELAYED RELEASE ORAL at 06:40

## 2021-09-05 RX ADMIN — SODIUM CHLORIDE, PRESERVATIVE FREE 10 ML: 5 INJECTION INTRAVENOUS at 08:55

## 2021-09-05 RX ADMIN — GUAIFENESIN 1200 MG: 600 TABLET, EXTENDED RELEASE ORAL at 08:55

## 2021-09-05 RX ADMIN — DEXAMETHASONE SODIUM PHOSPHATE 6 MG: 4 INJECTION, SOLUTION INTRA-ARTICULAR; INTRALESIONAL; INTRAMUSCULAR; INTRAVENOUS; SOFT TISSUE at 08:54

## 2021-09-05 RX ADMIN — BUPROPION HYDROCHLORIDE 300 MG: 150 TABLET, FILM COATED, EXTENDED RELEASE ORAL at 08:55

## 2021-09-05 RX ADMIN — ALBUTEROL SULFATE 2 PUFF: 90 AEROSOL, METERED RESPIRATORY (INHALATION) at 06:40

## 2021-09-05 NOTE — PROGRESS NOTES
Subjective     History:   Melyssa Reagan is a 60 y.o. female admitted on 8/29/2021 secondary to COVID-19     Procedures: None    CC: Follow up COVID-19    Patient seen and examined at bedside. Awake and alert. States she continues to feel overall improved. Dyspnea and chest congestion improving. Continues to diurese well with Lasix. O2 requirements improved. No acute events overnight per RN.     History taken from: patient, chart, and RN.      Objective     Vital Signs  Temp:  [97.1 °F (36.2 °C)-98.1 °F (36.7 °C)] 98.1 °F (36.7 °C)  Heart Rate:  [73-95] 77  Resp:  [18] 18  BP: (104-119)/(62-68) 119/68    Intake/Output Summary (Last 24 hours) at 9/4/2021 2149  Last data filed at 9/4/2021 1348  Gross per 24 hour   Intake 1020 ml   Output --   Net 1020 ml         Physical Exam:  General:    Awake, alert, in no acute distress   Heart:      Normal S1 and S2. Regular rate and rhythm. No significant murmur, rubs or gallops appreciated.   Lungs:     Respirations regular, even and unlabored. Lungs clear to auscultation. No wheezes, rales or rhonchi.   Abdomen:   Soft and nontender. No guarding, rebound tenderness or  organomegaly noted. Bowel sounds present x 4.   Extremities:  No clubbing, cyanosis or edema noted. Moves UE and LE equally B/L.     Results Review:    Results from last 7 days   Lab Units 09/04/21  0430 09/03/21 0211 09/02/21 0713 09/01/21  1043 08/31/21  0449 08/30/21  0152 08/29/21  1634   WBC 10*3/mm3 8.83 8.69 6.23 8.05 8.04 6.46 8.80   HEMOGLOBIN g/dL 13.6 13.4 13.0 13.2 12.1 13.0 14.2   PLATELETS 10*3/mm3 434 339 296 265 222 190 248     Results from last 7 days   Lab Units 09/04/21  0430 09/03/21  0211 09/02/21  0713 09/01/21  1043 08/31/21 0449 08/30/21  0152 08/29/21  1634   SODIUM mmol/L 134* 137 139 131* 138 137 135*   POTASSIUM mmol/L 3.8 3.5 3.6 4.0 4.0 4.4 4.1   CHLORIDE mmol/L 97* 99 100 96* 101 100 96*   CO2 mmol/L 22.6 24.1 26.4 22.7 24.6 22.0 21.6*   BUN mg/dL 20 24* 24* 23* 24* 19 19    CREATININE mg/dL 0.72 0.74 0.73 0.68 0.72 0.69 0.82   CALCIUM mg/dL 8.9 8.9 8.9 9.1 8.7 8.8 9.3   GLUCOSE mg/dL 111* 116* 93 106* 112* 195* 202*     Results from last 7 days   Lab Units 09/04/21  0430 09/03/21  0211 09/02/21  0713 09/01/21  1043 08/31/21  0449 08/30/21  0152 08/29/21  1634   BILIRUBIN mg/dL 0.6 0.5 0.5 0.4 0.3 0.3 0.4   ALK PHOS U/L 36* 36* 53 41 34* 35* 40   AST (SGOT) U/L 88* 87* 98* 100* 89* 106* 143*   ALT (SGPT) U/L 67* 70* 73* 77* 74* 94* 112*     Results from last 7 days   Lab Units 09/02/21  0713 09/01/21  1043 08/31/21  0449 08/30/21  0152   MAGNESIUM mg/dL 2.0 2.1 1.8 2.0         Results from last 7 days   Lab Units 08/29/21  1843 08/29/21  1634   TROPONIN T ng/mL <0.010 <0.010       Imaging Results (Last 24 Hours)     ** No results found for the last 24 hours. **            Medications:  albuterol sulfate HFA, 2 puff, Inhalation, 4x Daily - RT  buPROPion XL, 300 mg, Oral, Daily  citalopram, 10 mg, Oral, Daily  dexamethasone, 6 mg, Intravenous, Daily  enoxaparin, 0.5 mg/kg, Subcutaneous, Nightly  estradiol, 10 mcg, Vaginal, Once per day on Mon Thu  guaiFENesin, 1,200 mg, Oral, Q12H  insulin aspart, 0-7 Units, Subcutaneous, TID AC  pantoprazole, 40 mg, Oral, Q AM      Pharmacy to Dose enoxaparin (LOVENOX),             Assessment/Plan   Septic shock with lactate >4 upon admission: Likely 2/2 COVID-19 pneumonia. Afebrile and hemodynamically stable. WBC is stable. CRP improving.  Lactate has normalized. Blood cultures with NGTD. Cont treatment of COVID-19 as outlined below. ID input appreciated.     COVID-19 pneumonia: Completed remdesivir. Cont decadron. Order a dose of Lasix again today to assist with lung compliance as she continues to diurese well with improving O2 requirements. Cont Lovenox. Cont supportive treatment.    Acute hypoxic respiratory failure:  Likely 2/2 above. No evidence of PE on CT chest. O2 requirements improving. Cont treatment as outlined above. Wean supplemental O2  as tolerated.      Elevated liver enzymes: Likely 2/2 above. Reported hx of fatty liver disease. Viral hepatitis panel is non-reactive. Stable today. Cont to monitor.     DM II, non-insulin dependent: BG stable. Cont SSI with Accuchecks.     DVT PPX: Lovenox    Disposition: Anticipate possible D/C home in the AM.         Jose West,   09/04/21  21:49 EDT

## 2021-09-05 NOTE — OUTREACH NOTE
Prep Survey      Responses   [facility] facility patient discharged from?  [facility]   Is LACE score < 7 ?  No   Emergency Room discharge w/ pulse ox?  No   Eligibility  Readm Mgmt   Discharge diagnosis  COVID-19   Does the patient have one of the following disease processes/diagnoses(primary or secondary)?  COVID-19   Does the patient have Home health ordered?  No   Is there a DME ordered?  No   Prep survey completed?  Yes          Anny Meza RN

## 2021-09-05 NOTE — PHARMACY PATIENT ASSISTANCE
Pharmacy checked on coverage of eliquis and according to her insurance, she will not have a copay.    Thank You;  Mely Ott, PharmD  09/05/21  08:41 EDT

## 2021-09-05 NOTE — NURSING NOTE
Discharge teaching complete. Patients daughter is meeting the oxygen company at the patients house and then she will be here to get the patient.

## 2021-09-05 NOTE — DISCHARGE SUMMARY
Date of Admission: 8/29/2021    Date of Discharge: 9/5/2021     PCP: Georgina Ramos, APRN    Admission Diagnosis:   Please see admission H&P    Discharge Diagnosis:   Septic shock  COVID-19 pneumonia  Acute hypoxic respiratory failure  Elevated liver enzymes  DM II, non-insulin dependent  Hx of fatty liver disease   Obesity by BMI    Procedures Performed: None     Consults:   Consults     Date and Time Order Name Status Description    8/30/2021 12:36 AM Inpatient Infectious Diseases Consult Completed             History of Present Illness:  Melyssa Reagan is a 60 y.o. female recently diagnosed with COVID-19 who presented to [facility] ED with CC of shortness of breath and myalgias. Please see admission H&P for complete details.     In the ED, workup revealed septic shock with lactate >4, bilateral COVID pneumonia and acute hypoxic respiratory failure.      Hospital Course  Melyssa Reagan was admitted to the med/surg floor for further evaluation and treatment. She was started on remdesivir and decadron. Lovenox was initiated for DVT PPX. ID was consulted for further input.     Upon admission her O2 was initially stable on 2L's NC but her O2 requirements quickly increased to 6L's NC. ID was in agreement with both remdesivir and decadron. Baricitinib was considered but her O2 stabilized on 6L's. She was diuresed with IV Lasix to assist with lung compliance and her O2 requirements began improving over the course of several days. Her WBC remained stable and CRP improved. Her lactate normalized as well. Her liver enzymes remained mildly elevated with reported hx of fatty liver disease but stable and she tolerated treatment with remdesivir well. Viral hepatitis panel was non-reactive. Her BG's remained stable in the setting of steroid therapy.     Over the course of several days her O2 requirements began improving and she was weaned back to 2L's NC by the time of discharge. Ms. Reagan was seen and examined at bedside on  9/5/21. She remained afebrile and hemodynamically stable with stable AM labs. Her O2 was stable on 2L's and she felt much improved from upon admission. She was deemed medically stable for discharge. She was prescribed steroids and Eliquis for DVT PPX per [facility] protocol. Supplemental O2 was arranged at discharge. Instructions were given for follow up with her PCP in 1 week.        Condition on Discharge:  Stable    Vital Signs  Vitals:    09/05/21 0723   BP: 108/80   Pulse: 88   Resp: 20   Temp: 97.9 °F (36.6 °C)   SpO2: 93%       Physical Exam:  General:    Awake, alert, in no acute distress   Heart:      Normal S1 and S2. Regular rate and rhythm. No significant murmur, rubs or gallops appreciated.   Lungs:     Respirations regular, even and unlabored. Lungs clear to auscultation B/L. No wheezes, rales or rhonchi.   Abdomen:   Soft and nontender. No guarding, rebound tenderness or  organomegaly noted. Bowel sounds present x 4.   Extremities:  No clubbing, cyanosis or edema noted. Moves UE and LE equally B/L.     Discharge Disposition:   home      Discharge Medications:     Discharge Medications      New Medications      Instructions Start Date   albuterol sulfate  (90 Base) MCG/ACT inhaler  Commonly known as: PROVENTIL HFA;VENTOLIN HFA;PROAIR HFA   2 puffs, Inhalation, 4 Times Daily - RT      apixaban 2.5 MG tablet tablet  Commonly known as: ELIQUIS   2.5 mg, Oral, Every 12 Hours Scheduled      benzonatate 100 MG capsule  Commonly known as: Tessalon Perles   100 mg, Oral, 3 Times Daily PRN      dexamethasone 6 MG tablet  Commonly known as: DECADRON   6 mg, Oral, Daily With Breakfast   Start Date: September 6, 2021     guaiFENesin 600 MG 12 hr tablet  Commonly known as: MUCINEX   1,200 mg, Oral, Every 12 Hours Scheduled      guaiFENesin-dextromethorphan 100-10 MG/5ML syrup  Commonly known as: ROBITUSSIN DM   5 mL, Oral, Every 4 Hours PRN         Continue These Medications      Instructions Start Date    buPROPion  MG 24 hr tablet  Commonly known as: WELLBUTRIN XL   300 mg, Oral, Daily      citalopram 10 MG tablet  Commonly known as: CeleXA   10 mg, Oral, Daily      estradiol 10 MCG tablet vaginal tablet  Commonly known as: VAGIFEM   1 tablet, Vaginal, 2 Times Weekly, Mondays and fridays      ezetimibe 10 MG tablet  Commonly known as: ZETIA   10 mg, Oral, Daily      fenofibrate 145 MG tablet  Commonly known as: TRICOR   145 mg, Oral, Daily      hydroCHLOROthiazide 25 MG tablet  Commonly known as: HYDRODIURIL   25 mg, Oral, Daily      metFORMIN 500 MG tablet  Commonly known as: GLUCOPHAGE   500 mg, Oral, 2 Times Daily With Meals               Discharge Diet:   Dietary Orders (From admission, onward)     Start     Ordered    08/29/21 1833  Diet Regular; Consistent Carbohydrate  Diet Effective Now     Question Answer Comment   Diet Texture / Consistency Regular    Common Modifiers Consistent Carbohydrate        08/29/21 1833                Activity at Discharge:  activity as tolerated    Follow-up Appointments:  Additional Instructions for the Follow-ups that You Need to Schedule     Discharge Follow-up with PCP   As directed       Currently Documented PCP:    Georgina Ramos APRN    PCP Phone Number:    [phone]     Follow Up Details: Follow up with KIMANI Robbins in 1 week.           Follow-up Information     Georgina Ramos APRN .    Specialty: Family Medicine  Why: Follow up with KIMANI Robbins in 1 week.  Contact information:  [address]  [place] [address]  [phone]                       Test Results Pending at Discharge:  None     Jose West DO  09/05/21  08:26 EDT      Time: Greater than 30 minutes spent on this discharge.

## 2021-09-06 ENCOUNTER — READMISSION MANAGEMENT (OUTPATIENT)
Dept: CALL CENTER | Facility: HOSPITAL | Age: 61
End: 2021-09-06

## 2021-09-06 NOTE — OUTREACH NOTE
COVID-19 Week 1 Survey      Responses   [facility] patient discharged from?  [facility]   Does the patient have one of the following disease processes/diagnoses(primary or secondary)?  COVID-19   COVID-19 underlying condition?  None   Call Number  Call 1   Week 1 Call successful?  Yes   Call start time  1025   Call end time  1029   Discharge diagnosis  COVID-19   Meds reviewed with patient/caregiver?  Yes   Is the patient having any side effects they believe may be caused by any medication additions or changes?  No   Does the patient have all medications ordered at discharge?  Yes   Is the patient taking all medications as directed (includes completed medication regime)?  Yes   Does the patient have a primary care provider?   Yes   Does the patient have an appointment with their PCP or specialist within 7 days of discharge?  No   What is preventing the patient from scheduling follow up appointments within 7 days of discharge?  Haven't had time   Nursing Interventions  Advised patient to make appointment   Has the patient kept scheduled appointments due by today?  N/A   Has home health visited the patient within 72 hours of discharge?  N/A   DME comments  Hs oxygen at 2 L AAT   Psychosocial issues?  No   Did the patient receive a copy of their discharge instructions?  Yes   Did the patient receive a copy of COVID-19 specific instructions?  Yes   Nursing interventions  Reviewed instructions with patient   What is the patient's perception of their health status since discharge?  Improving   Does the patient have any of the following symptoms?  Cough, Shortness of breath   Nursing Interventions  Nurse provided patient education   Pulse Ox monitoring  Intermittent   Pulse Ox device source  Hospital   O2 Sat comments  94% on 2L   O2 Sat: education provided  Sat levels, When to seek care   O2 Sat education comments  Keep sats above 92%, come to ED if dropping and staying low   Is the patient/caregiver able to teach back  steps to recovery at home?  Rest and rebuild strength, gradually increase activity, Practice good oral hygiene, Eat a well-balance diet   If the patient is a current smoker, are they able to teach back resources for cessation?  Not a smoker   Is the patient/caregiver able to teach back the hierarchy of who to call/visit for symptoms/problems? PCP, Specialist, Home health nurse, Urgent Care, ED, 911  Yes   COVID-19 call completed?  Yes   Wrap up additional comments  Enc use of IS and good deep breaths. Pt will change out her toothbrush and chapstick. Enc fluids and rest.          Rosalia Osuna, RN

## 2021-09-06 NOTE — PAYOR COMM NOTE
"[facility] [place]  [address]  PHONE  [phone]  FAX  [phone]  NPI:  [id number]    PATIENT D/C 9/5/2021      Dona Reagan (60 y.o. Female)     Date of Birth Social Security Number Address Home Phone MRN    1960  [address]  [facility] [phone] [id number]    [employer] Marital Status          None        Admission Date Admission Type Admitting Provider Attending Provider Department, Room/Bed    8/29/21 Emergency Jose West DO  [facility] [address] [place], 3340/1S    Discharge Date Discharge Disposition Discharge Destination        9/5/2021 Home or Self Care              Attending Provider: (none)   Allergies: Sulfa Antibiotics    Isolation: None   Infection: COVID (confirmed) (08/29/21)   Code Status: Prior    Ht: 165.1 cm (65\")   Wt: 108 kg (238 lb 12.8 oz)    Admission Cmt: None   Principal Problem: COVID-19 [U07.1]                 Active Insurance as of 8/29/2021     Primary Coverage     Payor Plan Insurance Group Employer/Plan Group    [place] [id number]     Payor Plan Address Payor Plan Phone Number Payor Plan Fax Number Effective Dates    [employer] [address] [address] [phone]  1/1/2020 - None Entered    [facility] [address]       Subscriber Name Subscriber Birth Date Member ID       DONA REAGAN 1960 [id number]                 Emergency Contacts      (Rel.) Home Phone Work Phone Mobile Phone    Savanah Christine (Daughter) [phone] -- [phone]    MicahRolanda franklin (Daughter) -- -- [phone]              "

## 2021-09-07 ENCOUNTER — TELEPHONE (OUTPATIENT)
Dept: MEDSURG UNIT | Facility: HOSPITAL | Age: 61
End: 2021-09-07

## 2021-09-07 ENCOUNTER — READMISSION MANAGEMENT (OUTPATIENT)
Dept: CALL CENTER | Facility: HOSPITAL | Age: 61
End: 2021-09-07

## 2021-09-07 NOTE — CASE MANAGEMENT/SOCIAL WORK
Continued Stay Note  MAMTA Huang     Patient Name: Melyssa Reagan  MRN: [id number]  Today's Date: 9/7/2021    Admit Date: 8/29/2021    Discharge Plan     Row Name 09/07/21 0921       Plan    Final Discharge Disposition Code  01 - home or self-care    Final Note  Pt was d/c home 9/5/21 on Eliquis and Decadron and is to f/u with PCP.  [place] oxygen was arranged by nursing.       Lizette Reyan RN

## 2021-09-07 NOTE — OUTREACH NOTE
COVID-19 Week 1 Survey      Responses   [facility] patient discharged from?  [facility]   Does the patient have one of the following disease processes/diagnoses(primary or secondary)?  COVID-19   COVID-19 underlying condition?  None   Call Number  Call 2   Week 1 Call successful?  Yes   Call start time  1410   Call end time  1413   Discharge diagnosis  COVID-19   Meds reviewed with patient/caregiver?  Yes   Is the patient having any side effects they believe may be caused by any medication additions or changes?  No   Does the patient have all medications ordered at discharge?  Yes   Is the patient taking all medications as directed (includes completed medication regime)?  Yes   Does the patient have a primary care provider?   Yes   Does the patient have an appointment with their PCP or specialist within 7 days of discharge?  Yes   Has the patient kept scheduled appointments due by today?  N/A   Comments  Has appt Friday at 1:30   Has home health visited the patient within 72 hours of discharge?  N/A   What DME was ordered?  Oxygen   Has all DME been delivered?  Yes   Psychosocial issues?  No   Did the patient receive a copy of their discharge instructions?  Yes   Did the patient receive a copy of COVID-19 specific instructions?  Yes   Nursing interventions  Reviewed instructions with patient   What is the patient's perception of their health status since discharge?  Improving   Does the patient have any of the following symptoms?  Cough, Shortness of breath [Cough and shortness of breath with activity]   Pulse Ox monitoring  Intermittent   Pulse Ox device source  Hospital   O2 Sat comments  96-97% on 2L    O2 Sat: education provided  Sat levels, When to seek care   O2 Sat education comments  Keep sats above 92%, come to ED if dropping and staying low   Is the patient/caregiver able to teach back steps to recovery at home?  Set small, achievable goals for return to baseline health, Rest and rebuild strength,  gradually increase activity, Eat a well-balance diet, Make a list of questions for provider's appointment   If the patient is a current smoker, are they able to teach back resources for cessation?  Not a smoker   Is the patient/caregiver able to teach back the hierarchy of who to call/visit for symptoms/problems? PCP, Specialist, Home health nurse, Urgent Care, ED, 911  Yes   COVID-19 call completed?  Yes   Wrap up additional comments  States she is doing better each day.  She has a follow up with her PCP on Friday.  She has cleaned high traffic areas and changed out toothbrush.  Swati has been good          Sara Resendiz LPN

## 2021-09-08 ENCOUNTER — READMISSION MANAGEMENT (OUTPATIENT)
Dept: CALL CENTER | Facility: HOSPITAL | Age: 61
End: 2021-09-08

## 2021-09-13 ENCOUNTER — READMISSION MANAGEMENT (OUTPATIENT)
Dept: CALL CENTER | Facility: HOSPITAL | Age: 61
End: 2021-09-13

## 2021-09-13 NOTE — OUTREACH NOTE
COVID-19 Week 2 Survey      Responses   [facility] patient discharged from?  [facility]   Does the patient have one of the following disease processes/diagnoses(primary or secondary)?  COVID-19   COVID-19 underlying condition?  None   Call Number  Call 1   COVID-19 Week 2: Call 1 attempt successful?  Yes   Call start time  1115   Call end time  1139   Discharge diagnosis  COVID-19   Meds reviewed with patient/caregiver?  Yes   Is the patient having any side effects they believe may be caused by any medication additions or changes?  No   Does the patient have all medications ordered at discharge?  Yes   Prescription comments  ASA aded at PCP appt   Is the patient taking all medications as directed (includes completed medication regime)?  Yes   Does the patient have a primary care provider?   Yes   Does the patient have an appointment with their PCP or specialist within 7 days of discharge?  Yes   Has the patient kept scheduled appointments due by today?  Yes   Has home health visited the patient within 72 hours of discharge?  N/A   Psychosocial issues?  No   Comments  pt has family members with COVID, has been taking necessary precautions and intends to get vaccine in 3 months, is trying to manage DM, plans to lose more weight, and see DM dietician for better meal plan, also plans to start exercise walking plan   Did the patient receive a copy of their discharge instructions?  Yes   Did the patient receive a copy of COVID-19 specific instructions?  Yes   Nursing interventions  Reviewed instructions with patient   What is the patient's perception of their health status since discharge?  Improving   Does the patient have any of the following symptoms?  None   Nursing Interventions  Nurse provided patient education   Pulse Ox monitoring  Intermittent   Pulse Ox device source  Patient   O2 Sat comments  97-98% on RA, no longer needs O2   O2 Sat: education provided  Sat levels, Monitoring frequency, When to seek care    Is the patient/caregiver able to teach back steps to recovery at home?  Set small, achievable goals for return to baseline health, Rest and rebuild strength, gradually increase activity, Eat a well-balance diet [using Respirex]   Is the patient/caregiver able to teach back the hierarchy of who to call/visit for symptoms/problems? PCP, Specialist, Home health nurse, Urgent Care, ED, 911  Yes   COVID-19 call completed?  Yes          Blanca Pederson RN

## 2021-09-20 ENCOUNTER — READMISSION MANAGEMENT (OUTPATIENT)
Dept: CALL CENTER | Facility: HOSPITAL | Age: 61
End: 2021-09-20

## 2021-09-20 NOTE — OUTREACH NOTE
COVID-19 Week 3 Survey      Responses   [facility] patient discharged from?  [facility]   Does the patient have one of the following disease processes/diagnoses(primary or secondary)?  COVID-19   COVID-19 underlying condition?  None   Call Number  Call 1   COVID-19 Week 3: Call 1 attempt successful?  Yes   Call start time  0804   Call end time  0807   Discharge diagnosis  COVID-19   Meds reviewed with patient/caregiver?  Yes   Comments regarding appointments  Pt has followed up with pcp.   Has home health visited the patient within 72 hours of discharge?  N/A   COVID-19 call completed?  Yes   Wrap up additional comments  Pt reports feeling well. Pt is on RA and able to perform all ADL's independently.          Fiorella Durham RN

## 2021-09-27 ENCOUNTER — READMISSION MANAGEMENT (OUTPATIENT)
Dept: CALL CENTER | Facility: HOSPITAL | Age: 61
End: 2021-09-27

## 2021-09-27 NOTE — OUTREACH NOTE
COVID-19 Week 4 Survey      Responses   [facility] patient discharged from?  [place]   Does the patient have one of the following disease processes/diagnoses(primary or secondary)?  COVID-19   COVID-19 underlying condition?  None   Call Number  Call 1   COVID-19 Week 4: Call 1 attempt successful?  No   Discharge diagnosis  COVID-19          Rosalia Osuna RN

## 2021-10-08 ENCOUNTER — TRANSCRIBE ORDERS (OUTPATIENT)
Dept: ADMINISTRATIVE | Facility: HOSPITAL | Age: 61
End: 2021-10-08

## 2021-10-08 ENCOUNTER — HOSPITAL ENCOUNTER (OUTPATIENT)
Dept: GENERAL RADIOLOGY | Facility: HOSPITAL | Age: 61
Discharge: HOME | End: 2021-10-08
Admitting: NURSE PRACTITIONER

## 2021-10-08 DIAGNOSIS — R06.02 SOB (SHORTNESS OF BREATH): ICD-10-CM

## 2021-10-08 DIAGNOSIS — R05.9 COUGH, UNSPECIFIED: Primary | ICD-10-CM

## 2021-10-08 DIAGNOSIS — R05.9 COUGH, UNSPECIFIED: ICD-10-CM

## 2021-10-08 DIAGNOSIS — U09.9 POST COVID-19 CONDITION, UNSPECIFIED: ICD-10-CM

## 2021-10-08 DIAGNOSIS — R06.02 SHORTNESS OF BREATH: Primary | ICD-10-CM

## 2021-10-08 PROCEDURE — 71046 X-RAY EXAM CHEST 2 VIEWS: CPT

## 2021-10-08 PROCEDURE — 71046 X-RAY EXAM CHEST 2 VIEWS: CPT | Performed by: RADIOLOGY

## 2021-11-23 ENCOUNTER — TRANSCRIBE ORDERS (OUTPATIENT)
Dept: ADMINISTRATIVE | Facility: HOSPITAL | Age: 61
End: 2021-11-23

## 2021-11-23 DIAGNOSIS — Z01.818 PRE-OPERATIVE CLEARANCE: Primary | ICD-10-CM

## 2022-01-27 ENCOUNTER — TRANSCRIBE ORDERS (OUTPATIENT)
Dept: LAB | Facility: HOSPITAL | Age: 62
End: 2022-01-27

## 2022-01-27 DIAGNOSIS — Z01.818 PRE-OPERATIVE CLEARANCE: Primary | ICD-10-CM

## 2022-07-26 ENCOUNTER — OFFICE VISIT (OUTPATIENT)
Dept: SURGERY | Facility: CLINIC | Age: 62
End: 2022-07-26

## 2022-07-26 VITALS
SYSTOLIC BLOOD PRESSURE: 118 MMHG | BODY MASS INDEX: 36.49 KG/M2 | HEART RATE: 93 BPM | WEIGHT: 219 LBS | HEIGHT: 65 IN | DIASTOLIC BLOOD PRESSURE: 80 MMHG

## 2022-07-26 DIAGNOSIS — L72.3 SEBACEOUS CYST: ICD-10-CM

## 2022-07-26 DIAGNOSIS — L08.9 INFECTED CYST OF SKIN: Primary | ICD-10-CM

## 2022-07-26 DIAGNOSIS — L72.9 INFECTED CYST OF SKIN: Primary | ICD-10-CM

## 2022-07-26 DIAGNOSIS — L72.3 SEBACEOUS CYST: Primary | ICD-10-CM

## 2022-07-26 PROCEDURE — 99203 OFFICE O/P NEW LOW 30 MIN: CPT

## 2022-07-26 PROCEDURE — 87205 SMEAR GRAM STAIN: CPT

## 2022-07-26 PROCEDURE — 87070 CULTURE OTHR SPECIMN AEROBIC: CPT

## 2022-07-26 NOTE — PROGRESS NOTES
Subjective   Melyssa Reagan is a 61 y.o. female who presents today for Initial Evaluation    Chief Complaint:    Chief Complaint   Patient presents with   • Cyst        History of Present Illness:    History of Present Illness Melyssa is a 61-year-old female who presents with what she believes was an infected hair to her right back.  She states that she has had issues with this area on and off x1 year.  She states that it lasted about a year ago and has since came back.  She states that it has been irritating her over the last month.  She denies any drainage.  However she does complain of pain to the area.  Reports that her bra does make area worse.  She states that her primary care provider placed her on Keflex on Thursday.    The following portions of the patient's history were reviewed and updated as appropriate: allergies, current medications, past family history, past medical history, past social history, past surgical history and problem list.    Past Medical History:  Past Medical History:   Diagnosis Date   • Anxiety    • Diabetes mellitus (HCC)    • Hyperlipidemia    • Hypertension        Social History:  Social History     Socioeconomic History   • Marital status:    Tobacco Use   • Smoking status: Never Smoker   • Smokeless tobacco: Never Used   Vaping Use   • Vaping Use: Never used   Substance and Sexual Activity   • Alcohol use: No   • Sexual activity: Defer       Family History:  Family History   Problem Relation Age of Onset   • COPD Mother        Past Surgical History:  Past Surgical History:   Procedure Laterality Date   • CHOLECYSTECTOMY     • HYSTERECTOMY         Problem List:  Patient Active Problem List   Diagnosis   • COVID-19   • Essential hypertension   • Type II diabetes mellitus (HCC)   • Hyperlipidemia   • Cytokine release syndrome, grade 3       Allergy:   Allergies   Allergen Reactions   • Sulfa Antibiotics Rash        Current Medications:   Current Outpatient Medications    Medication Sig Dispense Refill   • albuterol sulfate  (90 Base) MCG/ACT inhaler Inhale 2 puffs 4 (Four) Times a Day. 6.7 g 0   • benzonatate (Tessalon Perles) 100 MG capsule Take 1 capsule by mouth 3 (Three) Times a Day As Needed for Cough. 30 capsule 0   • buPROPion XL (WELLBUTRIN XL) 300 MG 24 hr tablet Take 300 mg by mouth Daily.     • citalopram (CeleXA) 10 MG tablet Take 10 mg by mouth Daily.     • estradiol (VAGIFEM) 10 MCG tablet vaginal tablet Insert 1 tablet into the vagina 2 (Two) Times a Week. Mondays and fridays     • ezetimibe (ZETIA) 10 MG tablet Take 10 mg by mouth Daily.     • fenofibrate (TRICOR) 145 MG tablet Take 145 mg by mouth Daily.     • guaiFENesin (MUCINEX) 600 MG 12 hr tablet Take 2 tablets by mouth Every 12 (Twelve) Hours. 120 tablet 0   • guaiFENesin-dextromethorphan (ROBITUSSIN DM) 100-10 MG/5ML syrup Take 5 mL by mouth Every 4 (Four) Hours As Needed for Cough. 118 mL 0   • hydroCHLOROthiazide (HYDRODIURIL) 25 MG tablet Take 25 mg by mouth Daily.     • metFORMIN (GLUCOPHAGE) 500 MG tablet Take 500 mg by mouth 2 (Two) Times a Day With Meals.       No current facility-administered medications for this visit.       Review of Systems:    Review of Systems      Physical Exam:   Physical Exam  Constitutional:       Appearance: Normal appearance.       HENT:      Head: Normocephalic and atraumatic.      Right Ear: External ear normal.      Left Ear: External ear normal.   Eyes:      Extraocular Movements: Extraocular movements intact.      Pupils: Pupils are equal, round, and reactive to light.   Cardiovascular:      Rate and Rhythm: Normal rate.   Pulmonary:      Effort: Pulmonary effort is normal.   Abdominal:      Palpations: Abdomen is soft.   Musculoskeletal:         General: Normal range of motion.      Cervical back: Normal range of motion and neck supple.   Skin:     General: Skin is warm and dry.      Capillary Refill: Capillary refill takes less than 2 seconds.      Findings:  "Erythema present.      Comments: See diagram   Neurological:      General: No focal deficit present.      Mental Status: She is alert and oriented to person, place, and time.   Psychiatric:         Mood and Affect: Mood normal.         Behavior: Behavior normal.         Vitals:  Blood pressure 118/80, pulse 93, height 165.1 cm (65\"), weight 99.3 kg (219 lb).   Body mass index is 36.44 kg/m².     Procedure: Informed consent was obtained.  Area was cleansed with chlorhexidine.  Area was then draped.  A 15 blade scalpel was used to make a transverse incision through the middle of fluctuation.  Purulent odorous discharge noted.  Area was then cleansed with hydrogen peroxide.  There were remnants of a cyst wall noted inside of abscess.  All remnants were removed.  Area was then packed with 1/2 inch Nu Gauze and a bandage was placed.  There were no complications noted.  There is minimal blood loss noted.  Patient tolerated this procedure well.     Assessment & Plan   Diagnoses and all orders for this visit:    1. Infected cyst of skin (Primary)    Melyssa is a 61-year-old female who presents with an infected cyst to her right back.  I originally thought that this was an abscess, however, there were cyst wall remains noted upon opening. I will track culture.  Patient verbalized understanding of wound care and will pack wet-to-dry daily at home.  She will follow-up in approximately 1 week.  She will continue taking Keflex prescribed by her primary care provider.    Visit Diagnoses:    ICD-10-CM ICD-9-CM   1. Infected cyst of skin  L72.9 706.2    L08.9          MEDS ORDERED DURING VISIT:  No orders of the defined types were placed in this encounter.      Return in about 1 week (around 8/2/2022).             This document has been electronically signed by KIMANI Ty  July 26, 2022 13:53 EDT    Please note that portions of this note were completed with a voice recognition program.   "

## 2022-07-27 ENCOUNTER — PATIENT ROUNDING (BHMG ONLY) (OUTPATIENT)
Dept: SURGERY | Facility: CLINIC | Age: 62
End: 2022-07-27

## 2022-07-27 NOTE — PROGRESS NOTES
July 27, 2022    Hello, may I speak with Melyssa Reagan?    My name is jose maria    I am  with [facility]  [address], [place] [address] [phone].    Before we get started may I verify your date of birth? 1960    I am calling to officially welcome you to our practice and ask about your recent visit. Is this a good time to talk? yes    Tell me about your visit with us. What things went well?  everything went really good. But I do need to speak with someone about when I can remove th bandage?       We're always looking for ways to make our patients' experiences even better. Do you have recommendations on ways we may improve?  no    Overall were you satisfied with your first visit to our practice? yes       I appreciate you taking the time to speak with me today. Is there anything else I can do for you?YES   Everyone was really nice and I can talk to the MA and I will be good to go.  Thank you, and have a great day.

## 2022-07-29 LAB
BACTERIA SPEC AEROBE CULT: NORMAL
GRAM STN SPEC: NORMAL
GRAM STN SPEC: NORMAL

## 2022-08-04 ENCOUNTER — OFFICE VISIT (OUTPATIENT)
Dept: SURGERY | Facility: CLINIC | Age: 62
End: 2022-08-04

## 2022-08-04 VITALS — WEIGHT: 219 LBS | HEIGHT: 65 IN | BODY MASS INDEX: 36.49 KG/M2

## 2022-08-04 DIAGNOSIS — L72.9 INFECTED CYST OF SKIN: Primary | ICD-10-CM

## 2022-08-04 DIAGNOSIS — L08.9 INFECTED CYST OF SKIN: Primary | ICD-10-CM

## 2022-08-04 PROCEDURE — 99024 POSTOP FOLLOW-UP VISIT: CPT

## 2022-08-04 NOTE — PROGRESS NOTES
Melyssa is a 61-year-old female who presents status post infected cyst removal.  She reports that she has had some discomfort to the area on her right back due to the adhesive and the bandages.  She denies any other needs or concerns at this time.    Wound appears to be healing well.  There were some small fragments of cyst that had worked their way out.  I removed those with forceps.  Wound noted to bleed easily.  Granulation tissue noted.  There was some drainage noted on previous packing.  No signs of infection, no foul odors.    Melyssa is a 61-year-old female who is status post infected skin removal of right back.  She will continue to pack daily.  She will follow-up in office in 2 weeks.

## 2023-11-24 NOTE — OUTREACH NOTE
COVID-19 Week 1 Survey      Responses   [facility] patient discharged from?  [facility]   Does the patient have one of the following disease processes/diagnoses(primary or secondary)?  COVID-19   COVID-19 underlying condition?  None   Call Number  Call 3   Week 1 Call successful?  Yes   Call start time  [phone]   Call end time  [phone]   Discharge diagnosis  COVID-19   Comments  Has appt Friday at 1:30   Psychosocial issues?  No   What is the patient's perception of their health status since discharge?  Improving   Does the patient have any of the following symptoms?  Cough, Shortness of breath   Nursing Interventions  Nurse provided patient education   Pulse Ox monitoring  Intermittent   Pulse Ox device source  Hospital   O2 Sat comments  97% on 2L   O2 Sat: education provided  Sat levels, When to seek care   O2 Sat education comments  Keep sats above 92%, come to ED if dropping and staying low   Is the patient/caregiver able to teach back steps to recovery at home?  Rest and rebuild strength, gradually increase activity   COVID-19 call completed?  Yes   Wrap up additional comments  States she is doing better each day.  She has a follow up with her PCP on Friday.            Rosalia Osuna RN  
Yes

## 2024-03-04 ENCOUNTER — TRANSCRIBE ORDERS (OUTPATIENT)
Dept: ADMINISTRATIVE | Facility: HOSPITAL | Age: 64
End: 2024-03-04
Payer: MEDICAID

## 2024-03-04 ENCOUNTER — HOSPITAL ENCOUNTER (OUTPATIENT)
Dept: GENERAL RADIOLOGY | Facility: HOSPITAL | Age: 64
Discharge: HOME | End: 2024-03-04
Admitting: NURSE PRACTITIONER
Payer: MEDICAID

## 2024-03-04 DIAGNOSIS — R10.9 FLANK PAIN: ICD-10-CM

## 2024-03-04 DIAGNOSIS — R10.9 FLANK PAIN: Primary | ICD-10-CM

## 2024-03-04 PROCEDURE — 74018 RADEX ABDOMEN 1 VIEW: CPT | Performed by: RADIOLOGY

## 2024-03-04 PROCEDURE — 74018 RADEX ABDOMEN 1 VIEW: CPT

## 2024-03-14 ENCOUNTER — OFFICE VISIT (OUTPATIENT)
Dept: UROLOGY | Facility: CLINIC | Age: 64
End: 2024-03-14
Payer: MEDICAID

## 2024-03-14 VITALS
DIASTOLIC BLOOD PRESSURE: 75 MMHG | BODY MASS INDEX: 28.62 KG/M2 | HEIGHT: 65 IN | WEIGHT: 171.8 LBS | SYSTOLIC BLOOD PRESSURE: 119 MMHG | HEART RATE: 78 BPM

## 2024-03-14 DIAGNOSIS — N30.00 ACUTE CYSTITIS WITHOUT HEMATURIA: ICD-10-CM

## 2024-03-14 DIAGNOSIS — R35.0 FREQUENCY OF MICTURITION: ICD-10-CM

## 2024-03-14 DIAGNOSIS — N34.3 DYSURIA-FREQUENCY SYNDROME: Primary | ICD-10-CM

## 2024-03-14 DIAGNOSIS — R33.9 INCOMPLETE EMPTYING OF BLADDER: ICD-10-CM

## 2024-03-14 LAB
BILIRUB BLD-MCNC: NEGATIVE MG/DL
CLARITY, POC: CLEAR
COLOR UR: YELLOW
EXPIRATION DATE: ABNORMAL
GLUCOSE UR STRIP-MCNC: NEGATIVE MG/DL
KETONES UR QL: NEGATIVE
LEUKOCYTE EST, POC: ABNORMAL
Lab: ABNORMAL
NITRITE UR-MCNC: NEGATIVE MG/ML
PH UR: 6 [PH] (ref 5–8)
PROT UR STRIP-MCNC: NEGATIVE MG/DL
RBC # UR STRIP: NEGATIVE /UL
SP GR UR: 1.01 (ref 1–1.03)
UROBILINOGEN UR QL: NORMAL

## 2024-03-14 PROCEDURE — 87086 URINE CULTURE/COLONY COUNT: CPT | Performed by: NURSE PRACTITIONER

## 2024-03-14 RX ORDER — HYDROXYZINE HYDROCHLORIDE 10 MG/1
TABLET, FILM COATED ORAL
COMMUNITY
Start: 2024-03-09

## 2024-03-14 RX ORDER — POLYETHYLENE GLYCOL 3350 17 G/17G
POWDER, FOR SOLUTION ORAL
COMMUNITY
Start: 2024-03-04

## 2024-03-14 RX ORDER — PHENAZOPYRIDINE HYDROCHLORIDE 200 MG/1
200 TABLET, FILM COATED ORAL 3 TIMES DAILY PRN
Qty: 20 TABLET | Refills: 0 | Status: SHIPPED | OUTPATIENT
Start: 2024-03-14

## 2024-03-14 RX ORDER — FLUOXETINE 10 MG/1
CAPSULE ORAL
COMMUNITY
Start: 2024-02-18

## 2024-03-14 RX ORDER — ASPIRIN 81 MG/1
TABLET, COATED ORAL
COMMUNITY
Start: 2024-02-24

## 2024-03-14 RX ORDER — TIRZEPATIDE 5 MG/.5ML
INJECTION, SOLUTION SUBCUTANEOUS
COMMUNITY
Start: 2024-02-27

## 2024-03-14 NOTE — PROGRESS NOTES
Chief Complaint  Dysuria (NEW PATIENT WITH ACUTE CYSTITIS/OAB/DI/ATROPHIC VAGINITIS/)    Subjective          Melyssa Reagan presents to [facility] for DYSURIA/ACUTE CYSTITIS  History of Present Illness    The patient is a pleasant 63-year-old female who presents to clinic today for evaluation as a new patient consult. The patient has been referred to clinic by PCP with concerns of dysuria. On clinic evaluation, she is in no apparent discomfort. She, however, reports numerous other concerns including recurrent UTIs, and overactive bladder/detrusor instability with bouts of urine frequency, urgency, she denies any urinary incontinence.  She denies dysuria, she denies any burning with urination.  Denies vaginal irritation or discomfort persist consistent with yeast vaginitis.    She has had ongoing symptoms consistent with UTIs; however, the patient does not have any documented positive urine cultures. She also did have bilateral flank pain, lower back pain, pelvic pressure or suprapubic discomfort for which she had a KUB completed by her PCP. I have reviewed patient's KUB, which is unremarkable for any kidney stones. Her urinalysis in clinic today showed 2+ leukocyte esterase. It is negative for nitrite. It is negative for gross/microscopic hematuria. Her PVR 0 mL. The patient is currently not taking any antibiotics. She is taking Estradiol -VAGIFEM 10Mcg vaginal tablet for hormone replacement/atrophic vaginitis.    Patient reports that, since the first of the year, she has had 4 bladder infections. She is a huge water drinker.  However she reports THAT, she was told not to drink as much water the next time she comes to the doctor. Patient reports that each time she had this UTI, she feels pelvic pressure and suprapubic discomfort with bladder spasms.  She feels uncomfortable when this occurs. She gets up 4 to 5 times a night to urinate. She goes to bed at 10 or 11.  She  "denies any late night excessive drinking of fluids.      She denies any burning with urination. She feels heavy pelvic pressure and suprapubic discomfort. She may have foul-smelling urine when it first starts. She denies any urgency, leakage, or blood in her urine. Last week, she was put on 3 days of Keflex, which resolved her symptoms. She has already been on antibiotics since 03/11/2024. She has 4 children. She had a hysterectomy, her ovaries weren't removed.     She takes MiraLAX for constipation. She denies any nausea or vomiting. Her vaginal area is really dry.  Her Vagifem have been helpful for atrophic vaginitis.    FINDINGS KUB 03/04/24: An AP view of the abdomen and pelvis demonstrates an unremarkable bowel gas pattern with no evidence of obstruction. No radiopaque stones are seen overlying the renal shadows. The patient is status post cholecystectomy.     IMPRESSION:Unremarkable exam.  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 Negative for chills, diaphoresis, fatigue, fever and unexpected weight loss.   HENT:  Negative for congestion, ear discharge, ear pain, nosebleeds, rhinorrhea, sinus pressure and " sore throat.    Eyes:  Negative for blurred vision, double vision, photophobia, pain, redness and visual disturbance.   Respiratory:  Negative for apnea, cough, chest tightness, shortness of breath, wheezing and stridor.    Cardiovascular:  Negative for chest pain and palpitations.   Gastrointestinal:  Positive for abdominal distention, abdominal pain, constipation and nausea. Negative for diarrhea and vomiting.   Endocrine: Negative for polydipsia, polyphagia and polyuria.   Genitourinary:  Positive for decreased urine volume, dysuria, flank pain, frequency, pelvic pain, pelvic pressure and urinary incontinence. Negative for difficulty urinating, dyspareunia, genital sores, hematuria, urgency and vaginal discharge.   Musculoskeletal:  Positive for back pain and myalgias. Negative for arthralgias and joint swelling.   Skin:  Positive for dry skin. Negative for pallor, rash and wound.   Neurological:  Negative for dizziness, tremors, syncope, weakness, light-headedness, headache, memory problem and confusion.   Psychiatric/Behavioral:  Positive for sleep disturbance and stress. Negative for behavioral problems and decreased concentration.         Physical Exam  Constitutional:       General: She is in acute distress.      Appearance: She is well-developed.   HENT:      Head: Normocephalic and atraumatic.   Eyes:      Pupils: Pupils are equal, round, and reactive to light.   Neck:      Thyroid: No thyromegaly.      Trachea: No tracheal deviation.   Cardiovascular:      Rate and Rhythm: Normal rate and regular rhythm.      Heart sounds: No murmur heard.  Pulmonary:      Effort: Pulmonary effort is normal. No respiratory distress.      Breath sounds: Normal breath sounds. No stridor. No wheezing.   Abdominal:      General: Bowel sounds are normal. There is distension.      Palpations: Abdomen is soft.      Tenderness: There is abdominal tenderness.   Genitourinary:     Labia:         Right: No tenderness.         Left:  No tenderness.       Vagina: Normal. No vaginal discharge.      Comments: DYSURIA/FREQUENCY  Musculoskeletal:         General: Tenderness present. No deformity. Normal range of motion.      Cervical back: Normal range of motion.   Skin:     General: Skin is warm and dry.      Capillary Refill: Capillary refill takes less than 2 seconds.      Coloration: Skin is pale.      Findings: No erythema or rash.   Neurological:      Mental Status: She is alert and oriented to person, place, and time.      Cranial Nerves: No cranial nerve deficit.      Sensory: No sensory deficit.      Motor: Weakness present.      Coordination: Coordination normal.   Psychiatric:         Behavior: Behavior normal.         Thought Content: Thought content normal.         Judgment: Judgment normal.        Result Review :     UA          3/14/2024    10:51   Urinalysis   Ketones, UA Negative    Leukocytes, UA Moderate (2+)               Assessment and Plan    Problem List Items Addressed This Visit          Genitourinary and Reproductive     Acute cystitis without hematuria    Relevant Medications    phenazopyridine (Pyridium) 200 MG tablet    Dysuria-frequency syndrome - Primary    Relevant Medications    phenazopyridine (Pyridium) 200 MG tablet    Frequency of micturition    Relevant Orders    POC Urinalysis Dipstick, Automated (Completed)    Urine Culture - Urine, Urine, Random Void     Other Visit Diagnoses       Incomplete emptying of bladder        Relevant Orders    Bladder Scan (Completed)                        ASSESSMENT               DYSURIA /ACUTE CYSTITIS/ATROPHIC VAGINITIS  MS Melyssa Reagan is a pleasant 63-year-old female who presents to clinic today for with concerns of dysuria. On clinic evaluation, she is in no apparent discomfort. She, however, reports numerous other concerns including recurrent UTIs, and overactive bladder/detrusor instability with bouts of urine frequency, urgency, she denies any urinary incontinence.   She denies dysuria, she denies any burning with urination.  Denies vaginal irritation or discomfort persist consistent with yeast vaginitis.    She has had ongoing symptoms consistent with UTIs; however, the patient does not have any documented positive urine cultures. Her urinalysis in clinic today showed 2+ leukocyte esterase. It is negative for nitrite. It is negative for gross/microscopic hematuria. Her PVR 0 mL.    Interstitial cystitis-we discussed the diagnosis and management of this condition.  I indicated that it was a multifactorial condition with multifactorial symptomatology, Including frequency, urgency, the sensation of urinary tract infection in the absence of a positive culture, sexual symptomatology including significant dyspareunia.  We discussed treatment options including the importance of making a clinical diagnosis and the cystoscopic findings including Hunner's ulcers etc.  We also discussed medical management including pharmacologic treatment such as amitriptyline, naturopathic treatments such as pumpkin oil and which more aggressive options of Botox injections as well as the relative risks and merits of this.  We also discussed the use of dietary manipulation including the over-the-counter product relief which basically decreases the acid in the urine and avoidance of ascitic-containing foods such as citrus is etc.Sjogren's syndrome       DYSURIA: SHE HAS ATROPHIC VAGINITIS AND was using Vagifem 10 mcg vaginal suppository.  SHe denies having any groin trauma, nevertheless we discussed the different causes of painful urination such as infection, inflammation, dietary factors, or problems with her bladder, versus kidney stones. We discussed inflammation that can be caused by irritation to the skin, or dysuria caused by and underlying bladder conditions.We also discussed urinary tract infections which can be easily treated with antibiotics.  However her urine dipstick is completely negative for  any infection today.     We discussed anatomic obstructions or malformations due to ureteral stricture, pain due to external lesions on the genitalia, with urine touching this lesions causing pain.  Discussed external irritation reaction from frequent douching, or application of irritating/allergenic products to the vaginal area.  Patient denies utilizing any of these products.    IBS- Patient has significant irritable bowel syndrome, characterized by extreme amounts of constipation.  We spoke about the impact of this on bladder function.  We spoke about  its relationship to recurrent urinary tract infections.  We discussed the need for increasing p.o. fluid intake to at least 2 to 3 L of water daily, and discussed the physiology of colonic motility as well as use of MiraLAX as a bulk laxative versus the newer class of serotonin uptake blockers such as Linzess.  We stressed the need for a daily bowel movement and discussed the [place] stool scale at length.                                                                   PLAN  We will send her urine out for culture, I will call her with results if any positive bacterial growth.    We discussed treating infections so positive bacterial growth    She has been advised to drop off her urine sample each time she feels symptomatic    Recommend Pyridium 200 mg for her bladder spasms/discomfort    Encouraged to continue her bowel regimen with MiraLAX as tolerated    We discussed several things She can do to reduce the discomfort of painful urination/straining on urination such as increasing his p.o. fluid intake with water, and avoiding bladder irritants such as caffeine products, cola, soda drinks, and nicotine, also avoiding issues with constipation, as it puts pressure on the bladder.     She is Agreeable to watching HER diet to prevent  any bladder irritants such as citrus, caffeine, cola, soda drinks-DRINKS LOTS OF WATER.    We discussed lower tract investigation,  patient has been scheduled for cystoscopy on 04/01/24 WITH  Dr. Harp-Dysuria /CHRONIC VS ACUTE interstitial cystitis    Will see her back for follow-up, she may return sooner sooner if symptoms worsen     Patient/dad is agreeable with plan of care    Patient reports that she is not currently experiencing any symptoms of urinary incontinence.      Class 2 Severe Obesity (BMI >=35 and <=39.9). Obesity-related health conditions include the following: obstructive sleep apnea, hypertension, and coronary heart disease. Obesity is improving with lifestyle modifications. BMI is  28.59KG/M2 . We discussed portion control and increasing exercise.      RADIOLOGY (CT AND/OR KUB):    CT Abdomen and Pelvis: No results found for this or any previous visit.     CT Stone Protocol: No results found for this or any previous visit.     KUB: Results for orders placed during the hospital encounter of 03/04/24    XR Abdomen KUB    Narrative  PROCEDURE: XR ABDOMEN KUB-    HISTORY: FLANK PAIN; R10.9-Unspecified abdominal pain    COMPARISON: None.    FINDINGS: An AP view of the abdomen and pelvis demonstrates an  unremarkable bowel gas pattern with no evidence of obstruction. No  radiopaque stones are seen overlying the renal shadows. The patient is  status post cholecystectomy.    Impression  Unremarkable exam.  This report was finalized on 3/4/2024 12:27 PM by Lloyd Chin M.D..       LABS (3 MONTHS):    Office Visit on 03/14/2024   Component Date Value Ref Range Status    Color 03/14/2024 Yellow  Yellow, Straw, Dark Yellow, Lien Final    Clarity, UA 03/14/2024 Clear  Clear Final    Specific Gravity  03/14/2024 1.015  1.005 - 1.030 Final    pH, Urine 03/14/2024 6.0  5.0 - 8.0 Final    Leukocytes 03/14/2024 Moderate (2+) (A)  Negative Final    Nitrite, UA 03/14/2024 Negative  Negative Final    Protein, POC 03/14/2024 Negative  Negative mg/dL Final    Glucose, UA 03/14/2024 Negative  Negative mg/dL Final    Ketones, UA 03/14/2024  Negative  Negative Final    Urobilinogen, UA 03/14/2024 Normal  Normal, 0.2 E.U./dL Final    Bilirubin 03/14/2024 Negative  Negative Final    Blood, UA 03/14/2024 Negative  Negative Final    Lot Number 03/14/2024 [id number]   Final    Expiration Date 03/14/2024 10/25/24   Final      1. Bladder spasms.  She will be scheduled for cystoscopy for lower tract investigation. She will take Pyridium as needed.    2. Recurrent UTIs.  Her urinalysis today showed 2+ leukocyte esterase.  Sent her for culture    Follow-up  The patient will follow up in 1 month.    Smoking Cessation Counseling:  Never a smoker.  Patient does not currently use any tobacco products.       Follow Up   Return for Next scheduled follow up, WRicky Harp, Cystoscopy, RECURRENT UTI/DYSURIA/OAB/DI/CHRONIC CYSTITIS.    Patient was given instructions and counseling regarding her condition or for health maintenance advice. Please see specific information pulled into the AVS if appropriate.          This document has been electronically signed by Griselda Cheng-Akwa, APRN   March 14, 2024 13:58 EDT      Dictated Utilizing Dragon Dictation: Part of this note may be an electronic transcription/translation of spoken language to printed text using the Dragon Dictation System.     Transcribed from ambient dictation for Griselda Cheng-Akwa, APRN by Roxanne Ruffin.  03/14/24   12:39 EDT    Patient or patient representative verbalized consent to the visit recording.  I have personally performed the services described in this document as transcribed by the above individual, and it is both accurate and complete.

## 2024-03-17 LAB
BACTERIA UR CULT: NORMAL
BACTERIA UR CULT: NORMAL

## 2024-03-19 ENCOUNTER — TELEPHONE (OUTPATIENT)
Dept: UROLOGY | Facility: CLINIC | Age: 64
End: 2024-03-19
Payer: MEDICAID

## 2024-03-19 NOTE — TELEPHONE ENCOUNTER
I called the patient letting her know that her urine culture came back with no infection. I told her to be sure to follow up with Dr. Harp as discussed. The pt verbalized understanding.                           ----- Message from Griselda Cheng-Akwa, APRN sent at 3/18/2024  8:45 AM EDT -----  Please let patient know her urine culture results are negative at this time for any bacterial infection.  Follow-up in clinic as discussed with Dr. Harp 04/1/2024.      Thank you  Comment: Culture shows less than 10,000 colony forming units of bacteria per  milliliter of urine. This colony count is not generally considered  to be clinically significant.

## 2024-04-01 ENCOUNTER — PROCEDURE VISIT (OUTPATIENT)
Dept: UROLOGY | Facility: CLINIC | Age: 64
End: 2024-04-01
Payer: MEDICAID

## 2024-04-01 VITALS
HEIGHT: 65 IN | WEIGHT: 169.8 LBS | BODY MASS INDEX: 28.29 KG/M2 | HEART RATE: 76 BPM | DIASTOLIC BLOOD PRESSURE: 78 MMHG | SYSTOLIC BLOOD PRESSURE: 151 MMHG

## 2024-04-01 DIAGNOSIS — N30.10 IC (INTERSTITIAL CYSTITIS): ICD-10-CM

## 2024-04-01 DIAGNOSIS — R35.0 FREQUENCY OF MICTURITION: Primary | ICD-10-CM

## 2024-04-01 RX ORDER — GENTAMICIN SULFATE 40 MG/ML
80 INJECTION, SOLUTION INTRAMUSCULAR; INTRAVENOUS ONCE
Status: COMPLETED | OUTPATIENT
Start: 2024-04-01 | End: 2024-04-01

## 2024-04-01 RX ADMIN — GENTAMICIN SULFATE 80 MG: 40 INJECTION, SOLUTION INTRAMUSCULAR; INTRAVENOUS at 13:18

## 2024-04-01 NOTE — PROGRESS NOTES
Chief Complaint:      Chief Complaint   Patient presents with    Cystoscopy    Urinary Frequency       HPI:   The patient is a pleasant 63-year-old female who presents to clinic today for evaluation as a new patient consult. The patient has been referred to clinic by PCP with concerns of dysuria. On clinic evaluation, she is in no apparent discomfort. She, however, reports numerous other concerns including recurrent UTIs, and overactive bladder/detrusor instability with bouts of urine frequency, urgency, she denies any urinary incontinence.  She denies dysuria, she denies any burning with urination.  Denies vaginal irritation or discomfort persist consistent with yeast vaginitis.     She has had ongoing symptoms consistent with UTIs; however, the patient does not have any documented positive urine cultures. She also did have bilateral flank pain, lower back pain, pelvic pressure or suprapubic discomfort for which she had a KUB completed by her PCP. I have reviewed patient's KUB, which is unremarkable for any kidney stones. Her urinalysis in clinic today showed 2+ leukocyte esterase. It is negative for nitrite. It is negative for gross/microscopic hematuria. Her PVR 0 mL. The patient is currently not taking any antibiotics. She is taking Estradiol -VAGIFEM 10Mcg vaginal tablet for hormone replacement/atrophic vaginitis.     Patient reports that, since the first of the year, she has had 4 bladder infections. She is a huge water drinker.  However she reports THAT, she was told not to drink as much water the next time she comes to the doctor. Patient reports that each time she had this UTI, she feels pelvic pressure and suprapubic discomfort with bladder spasms.  She feels uncomfortable when this occurs. She gets up 4 to 5 times a night to urinate. She goes to bed at 10 or 11.  She denies any late night excessive drinking of fluids.       She denies any burning with urination. She feels heavy pelvic pressure and  suprapubic discomfort. She may have foul-smelling urine when it first starts. She denies any urgency, leakage, or blood in her urine. Last week, she was put on 3 days of Keflex, which resolved her symptoms. She has already been on antibiotics since 03/11/2024. She has 4 children. She had a hysterectomy, her ovaries weren't removed.      She takes MiraLAX for constipation. She denies any nausea or vomiting. Her vaginal area is really dry.  Her Vagifem have been helpful for atrophic vaginitis.     FINDINGS KUB 03/04/24: An AP view of the abdomen and pelvis demonstrates an unremarkable bowel gas pattern with no evidence of obstruction. No radiopaque stones are seen overlying the renal shadows. The patient is status post cholecystectomy.  She is now referred for cystoscopy.  She says she feels like she has a urinary tract infection all the time but in fact her cultures were always negative she has a very dry vagina.  Discussed interstitial cystitis    Past Medical History:     Past Medical History:   Diagnosis Date    Anxiety     Diabetes mellitus     Hyperlipidemia     Hypertension        Current Meds:     Current Outpatient Medications   Medication Sig Dispense Refill    albuterol sulfate  (90 Base) MCG/ACT inhaler Inhale 2 puffs 4 (Four) Times a Day. 6.7 g 0    Aspirin Low Dose 81 MG EC tablet       buPROPion XL (WELLBUTRIN XL) 300 MG 24 hr tablet Take 1 tablet by mouth Daily.      estradiol (VAGIFEM) 10 MCG tablet vaginal tablet Insert 1 tablet into the vagina 2 (Two) Times a Week. Mondays and fridays      ezetimibe (ZETIA) 10 MG tablet Take 1 tablet by mouth Daily.      fenofibrate (TRICOR) 145 MG tablet Take 1 tablet by mouth Daily.      FLUoxetine (PROzac) 10 MG capsule       guaiFENesin (MUCINEX) 600 MG 12 hr tablet Take 2 tablets by mouth Every 12 (Twelve) Hours. 120 tablet 0    hydroCHLOROthiazide (HYDRODIURIL) 25 MG tablet Take 1 tablet by mouth Daily.      hydrOXYzine (ATARAX) 10 MG tablet        metFORMIN (GLUCOPHAGE) 500 MG tablet Take 1 tablet by mouth 2 (Two) Times a Day With Meals.      Mounjaro 5 MG/0.5ML solution pen-injector pen       phenazopyridine (Pyridium) 200 MG tablet Take 1 tablet by mouth 3 (Three) Times a Day As Needed for Bladder Spasms. 20 tablet 0    polyethylene glycol (MIRALAX) 17 GM/SCOOP powder        No current facility-administered medications for this visit.        Allergies:      Allergies   Allergen Reactions    Sulfa Antibiotics Rash        Past Surgical History:     Past Surgical History:   Procedure Laterality Date    CHOLECYSTECTOMY      HYSTERECTOMY         Social History:     Social History     Socioeconomic History    Marital status:    Tobacco Use    Smoking status: Never    Smokeless tobacco: Never   Vaping Use    Vaping status: Never Used   Substance and Sexual Activity    Alcohol use: No    Drug use: Defer    Sexual activity: Defer       Family History:     Family History   Problem Relation Age of Onset    COPD Mother        Review of Systems:     Review of Systems   Constitutional: Negative.  Negative for activity change, appetite change, chills, diaphoresis, fatigue and unexpected weight change.   HENT:  Negative for congestion, dental problem, drooling, ear discharge, ear pain, facial swelling, hearing loss, mouth sores, nosebleeds, postnasal drip, rhinorrhea, sinus pressure, sneezing, sore throat, tinnitus, trouble swallowing and voice change.    Eyes: Negative.  Negative for photophobia, pain, discharge, redness, itching and visual disturbance.   Respiratory: Negative.  Negative for apnea, cough, choking, chest tightness, shortness of breath, wheezing and stridor.    Cardiovascular: Negative.  Negative for chest pain, palpitations and leg swelling.   Gastrointestinal: Negative.  Negative for abdominal distention, abdominal pain, anal bleeding, blood in stool, constipation, diarrhea, nausea, rectal pain and vomiting.   Endocrine: Negative.  Negative for  cold intolerance, heat intolerance, polydipsia, polyphagia and polyuria.   Musculoskeletal: Negative.  Negative for arthralgias, back pain, gait problem, joint swelling, myalgias, neck pain and neck stiffness.   Skin: Negative.  Negative for color change, pallor, rash and wound.   Allergic/Immunologic: Negative.  Negative for environmental allergies, food allergies and immunocompromised state.   Neurological: Negative.  Negative for dizziness, tremors, seizures, syncope, facial asymmetry, speech difficulty, weakness, light-headedness, numbness and headaches.   Hematological: Negative.  Negative for adenopathy. Does not bruise/bleed easily.   Psychiatric/Behavioral:  Negative for agitation, behavioral problems, confusion, decreased concentration, dysphoric mood, hallucinations, self-injury, sleep disturbance and suicidal ideas. The patient is not nervous/anxious and is not hyperactive.    All other systems reviewed and are negative.      Physical Exam:     Physical Exam  Constitutional:       Appearance: She is well-developed.   HENT:      Head: Normocephalic and atraumatic.      Right Ear: External ear normal.      Left Ear: External ear normal.   Eyes:      Conjunctiva/sclera: Conjunctivae normal.      Pupils: Pupils are equal, round, and reactive to light.   Cardiovascular:      Rate and Rhythm: Normal rate and regular rhythm.      Heart sounds: Normal heart sounds.   Pulmonary:      Effort: Pulmonary effort is normal.      Breath sounds: Normal breath sounds.   Abdominal:      General: Bowel sounds are normal. There is no distension.      Palpations: Abdomen is soft. There is no mass.      Tenderness: There is no abdominal tenderness. There is no guarding or rebound.   Genitourinary:     General: Normal vulva.      Vagina: No vaginal discharge.   Musculoskeletal:         General: Normal range of motion.   Skin:     General: Skin is warm and dry.   Neurological:      Mental Status: She is alert.      Deep Tendon  Reflexes: Reflexes are normal and symmetric.   Psychiatric:         Behavior: Behavior normal.         Thought Content: Thought content normal.         Judgment: Judgment normal.         I have reviewed the following portions of the patient's history: Allergies, current medications, past family history, past medical history, past social history, past surgical history, problem list, and ROS and confirm it is accurate.    Recent Image (CT and/or KUB):      CT Abdomen and Pelvis: No results found for this or any previous visit.       CT Stone Protocol: No results found for this or any previous visit.       KUB: Results for orders placed during the hospital encounter of 03/04/24    XR Abdomen KUB    Narrative  PROCEDURE: XR ABDOMEN KUB-    HISTORY: FLANK PAIN; R10.9-Unspecified abdominal pain    COMPARISON: None.    FINDINGS: An AP view of the abdomen and pelvis demonstrates an  unremarkable bowel gas pattern with no evidence of obstruction. No  radiopaque stones are seen overlying the renal shadows. The patient is  status post cholecystectomy.    Impression  Unremarkable exam.          This report was finalized on 3/4/2024 12:27 PM by Lloyd Chin M.D..       Labs (past 3 months):      Office Visit on 03/14/2024   Component Date Value Ref Range Status    Color 03/14/2024 Yellow  Yellow, Straw, Dark Yellow, Lien Final    Clarity, UA 03/14/2024 Clear  Clear Final    Specific Gravity  03/14/2024 1.015  1.005 - 1.030 Final    pH, Urine 03/14/2024 6.0  5.0 - 8.0 Final    Leukocytes 03/14/2024 Moderate (2+) (A)  Negative Final    Nitrite, UA 03/14/2024 Negative  Negative Final    Protein, POC 03/14/2024 Negative  Negative mg/dL Final    Glucose, UA 03/14/2024 Negative  Negative mg/dL Final    Ketones, UA 03/14/2024 Negative  Negative Final    Urobilinogen, UA 03/14/2024 Normal  Normal, 0.2 E.U./dL Final    Bilirubin 03/14/2024 Negative  Negative Final    Blood, UA 03/14/2024 Negative  Negative Final    Lot Number  03/14/2024 [id number]   Final    Expiration Date 03/14/2024 10/25/24   Final    Urine Culture 03/14/2024 Final report   Final    Result 1 03/14/2024 Comment   Final    Culture shows less than 10,000 colony forming units of bacteria per  milliliter of urine. This colony count is not generally considered  to be clinically significant.        Procedure:   Cystoscopy:  Patient presents today for cystourethroscopy.  I went ahead and obtained an informed consent including the risks of anesthesia, bleeding, infection, etc.  After prepping and draping in a sterile fashion in the low dorsal lithotomy position, the urethra was gently anesthetized with 10 mL of 2% viscous Xylocaine jelly.  After an appropriate period of topical anesthesia, I used the Olympus digital 14 [place] flexible cystoscope to examine the anterior urethra which was completely normal.  The ureteral orifices were visualized and normal in position and configuration. There were no stones, tumors, or foreign bodies.  The blue light was enabled and was negative allowing us to see small mucosal lesions. The patient was given 80 mg of gentamicin in an intramuscular fashion as prophylaxis for the cystoscopy and released from the clinic    Assessment/Plan:   Interstitial cystitis-we discussed the diagnosis and management of this condition.  I indicated that it was a multifactorial condition with multifactorial symptomatology.  Risks include frequency, urgency, the sensation of urinary tract infection in the absence of a positive culture, and sexual symptomatology including significant dyspareunia.  We discussed treatment options including the importance of making a clinical diagnosis and the cystoscopic findings including Hunner's ulcers, etc.  We also discussed medical management including pharmacologic treatment such as amitriptyline, naturopathic treatments such as pumpkin oil, and more aggressive options of Botox injections, as well as the relative risks  and merits of this.  We also discussed the use of dietary manipulation including the over-the-counter product relief which basically decreases the acid in the urine and avoidance of ascitic-containing foods such as citrus, etc. her bladder was normal on cystoscopy today.  I believe her problem is more of vaginal irritation from drying.              This document has been electronically signed by PRITI CARMONA MD April 1, 2024 13:03 EDT    Dictated Utilizing Dragon Dictation: Part of this note may be an electronic transcription/translation of spoken language to printed text using the Dragon Dictation System.

## 2024-04-03 PROBLEM — N30.10 IC (INTERSTITIAL CYSTITIS): Status: ACTIVE | Noted: 2024-04-03

## 2024-04-04 ENCOUNTER — OFFICE VISIT (OUTPATIENT)
Dept: SURGERY | Facility: CLINIC | Age: 64
End: 2024-04-04
Payer: MEDICAID

## 2024-04-04 VITALS — BODY MASS INDEX: 28.32 KG/M2 | HEIGHT: 65 IN | WEIGHT: 170 LBS

## 2024-04-04 DIAGNOSIS — L72.9 SKIN CYST: Primary | ICD-10-CM

## 2024-04-04 RX ORDER — DOXYCYCLINE HYCLATE 100 MG/1
100 CAPSULE ORAL 2 TIMES DAILY
Qty: 20 CAPSULE | Refills: 0 | Status: SHIPPED | OUTPATIENT
Start: 2024-04-04

## 2024-04-04 NOTE — PROGRESS NOTES
Subjective   Melyssa Reagan is a 63 y.o. female.     Chief Complaint: back cyst    History of Present Illness She is a 64 yo who has had a infected cyst on her back in the past that was drained. The cyst flared up again last week. It has been draining for the last few days.    The following portions of the patient's history were reviewed and updated as appropriate: current medications, past family history, past medical history, past social history, past surgical history and problem list.    Review of Systems    Objective   Physical Exam Right mid back has small inflamed cyst with some drainage.     Past Medical History:   Diagnosis Date    Anxiety     Diabetes mellitus     Hyperlipidemia     Hypertension        Family History   Problem Relation Age of Onset    COPD Mother        Social History     Tobacco Use    Smoking status: Never    Smokeless tobacco: Never   Vaping Use    Vaping status: Never Used   Substance Use Topics    Alcohol use: No    Drug use: Defer       Past Surgical History:   Procedure Laterality Date    CHOLECYSTECTOMY      HYSTERECTOMY         Current Outpatient Medications   Medication Instructions    albuterol sulfate  (90 Base) MCG/ACT inhaler 2 puffs, Inhalation, 4 Times Daily - RT    Aspirin Low Dose 81 MG EC tablet     buPROPion XL (WELLBUTRIN XL) 300 mg, Oral, Daily    estradiol (VAGIFEM) 10 MCG tablet vaginal tablet 1 tablet, Vaginal, 2 Times Weekly, Mondays and fridays     ezetimibe (ZETIA) 10 mg, Oral, Daily    fenofibrate (TRICOR) 145 mg, Oral, Daily    FLUoxetine (PROzac) 10 MG capsule     guaiFENesin (MUCINEX) 1,200 mg, Oral, Every 12 Hours Scheduled    hydroCHLOROthiazide 25 mg, Oral, Daily    hydrOXYzine (ATARAX) 10 MG tablet     metFORMIN (GLUCOPHAGE) 500 mg, Oral, 2 Times Daily With Meals    Mounjaro 5 MG/0.5ML solution pen-injector pen     phenazopyridine (PYRIDIUM) 200 mg, Oral, 3 Times Daily PRN    polyethylene glycol (MIRALAX) 17 GM/SCOOP powder          Assessment &  Plan   Diagnoses and all orders for this visit:    1. Skin cyst (Primary)    Start antibiotics and return 1 wk.  Hopefully, it can be excised when the infection clears.

## 2024-04-22 ENCOUNTER — OFFICE VISIT (OUTPATIENT)
Dept: SURGERY | Facility: CLINIC | Age: 64
End: 2024-04-22
Payer: MEDICAID

## 2024-04-22 VITALS — BODY MASS INDEX: 28.32 KG/M2 | HEIGHT: 65 IN | WEIGHT: 170 LBS

## 2024-04-22 DIAGNOSIS — L72.9 SKIN CYST: Primary | ICD-10-CM

## 2024-04-22 DIAGNOSIS — L72.9 SKIN CYST: ICD-10-CM

## 2024-04-22 PROCEDURE — 11401 EXC TR-EXT B9+MARG 0.6-1 CM: CPT | Performed by: SURGERY

## 2024-04-22 PROCEDURE — 99212 OFFICE O/P EST SF 10 MIN: CPT | Performed by: SURGERY

## 2024-04-22 PROCEDURE — 1160F RVW MEDS BY RX/DR IN RCRD: CPT | Performed by: SURGERY

## 2024-04-22 PROCEDURE — 1159F MED LIST DOCD IN RCRD: CPT | Performed by: SURGERY

## 2024-04-22 NOTE — PROGRESS NOTES
Subjective   Melyssa Reagan is a 63 y.o. female.     Chief Complaint: skin cyst    History of Present Illness She is a 64 yo who had a inflamed skin cyst on her right back.     The following portions of the patient's history were reviewed and updated as appropriate: current medications, past family history, past medical history, past social history, past surgical history and problem list.    Review of Systems    Objective   Physical Exam inflammation is better. A 1 cm cyst excised with lidocaine and nylon sutures    Past Medical History:   Diagnosis Date    Anxiety     Diabetes mellitus     Hyperlipidemia     Hypertension        Family History   Problem Relation Age of Onset    COPD Mother        Social History     Tobacco Use    Smoking status: Never    Smokeless tobacco: Never   Vaping Use    Vaping status: Never Used   Substance Use Topics    Alcohol use: No    Drug use: Never       Past Surgical History:   Procedure Laterality Date    CHOLECYSTECTOMY      HYSTERECTOMY         Current Outpatient Medications   Medication Instructions    albuterol sulfate  (90 Base) MCG/ACT inhaler 2 puffs, Inhalation, 4 Times Daily - RT    Aspirin Low Dose 81 MG EC tablet     buPROPion XL (WELLBUTRIN XL) 300 mg, Oral, Daily    doxycycline (VIBRAMYCIN) 100 mg, Oral, 2 Times Daily    doxycycline (VIBRAMYCIN) 100 mg, Oral, 2 Times Daily    estradiol (VAGIFEM) 10 MCG tablet vaginal tablet 1 tablet, Vaginal, 2 Times Weekly, Mondays and fridays     ezetimibe (ZETIA) 10 mg, Oral, Daily    fenofibrate (TRICOR) 145 mg, Oral, Daily    FLUoxetine (PROzac) 10 MG capsule     guaiFENesin (MUCINEX) 1,200 mg, Oral, Every 12 Hours Scheduled    hydroCHLOROthiazide 25 mg, Oral, Daily    hydrOXYzine (ATARAX) 10 MG tablet     metFORMIN (GLUCOPHAGE) 500 mg, Oral, 2 Times Daily With Meals    Mounjaro 5 MG/0.5ML solution pen-injector pen     phenazopyridine (PYRIDIUM) 200 mg, Oral, 3 Times Daily PRN    polyethylene glycol (MIRALAX) 17 GM/SCOOP  powder          Assessment & Plan   Diagnoses and all orders for this visit:    1. Skin cyst (Primary)    Remove sutures 1 wk

## 2024-04-23 LAB — REF LAB TEST METHOD: NORMAL

## 2024-04-29 ENCOUNTER — TRANSCRIBE ORDERS (OUTPATIENT)
Dept: ADMINISTRATIVE | Facility: HOSPITAL | Age: 64
End: 2024-04-29
Payer: MEDICAID

## 2024-04-29 ENCOUNTER — OFFICE VISIT (OUTPATIENT)
Dept: SURGERY | Facility: CLINIC | Age: 64
End: 2024-04-29
Payer: MEDICAID

## 2024-04-29 VITALS — HEIGHT: 65 IN | BODY MASS INDEX: 28.32 KG/M2 | WEIGHT: 170 LBS

## 2024-04-29 DIAGNOSIS — K76.0 FATTY METAMORPHOSIS OF LIVER: Primary | ICD-10-CM

## 2024-04-29 DIAGNOSIS — L72.9 SKIN CYST: Primary | ICD-10-CM

## 2024-04-29 PROCEDURE — 99024 POSTOP FOLLOW-UP VISIT: CPT | Performed by: SURGERY

## 2024-04-29 PROCEDURE — 1159F MED LIST DOCD IN RCRD: CPT | Performed by: SURGERY

## 2024-04-29 PROCEDURE — 1160F RVW MEDS BY RX/DR IN RCRD: CPT | Performed by: SURGERY

## 2024-05-13 ENCOUNTER — HOSPITAL ENCOUNTER (OUTPATIENT)
Dept: ULTRASOUND IMAGING | Facility: HOSPITAL | Age: 64
Discharge: HOME | End: 2024-05-13
Payer: MEDICAID

## 2024-06-10 ENCOUNTER — HOSPITAL ENCOUNTER (OUTPATIENT)
Dept: ULTRASOUND IMAGING | Facility: HOSPITAL | Age: 64
Discharge: HOME | End: 2024-06-10
Admitting: NURSE PRACTITIONER
Payer: MEDICAID

## 2024-06-10 DIAGNOSIS — K76.0 FATTY METAMORPHOSIS OF LIVER: ICD-10-CM

## 2024-06-10 PROCEDURE — 76700 US EXAM ABDOM COMPLETE: CPT

## 2024-06-10 PROCEDURE — 76700 US EXAM ABDOM COMPLETE: CPT | Performed by: RADIOLOGY
